# Patient Record
Sex: FEMALE | Race: BLACK OR AFRICAN AMERICAN | Employment: OTHER | URBAN - METROPOLITAN AREA
[De-identification: names, ages, dates, MRNs, and addresses within clinical notes are randomized per-mention and may not be internally consistent; named-entity substitution may affect disease eponyms.]

---

## 2017-03-31 ENCOUNTER — HOSPITAL ENCOUNTER (OUTPATIENT)
Age: 82
Setting detail: SPECIMEN
Discharge: HOME | End: 2017-03-31
Payer: MEDICARE

## 2017-03-31 DIAGNOSIS — I10 ESSENTIAL HYPERTENSION: ICD-10-CM

## 2017-03-31 DIAGNOSIS — E78.00 HIGH CHOLESTEROL: ICD-10-CM

## 2017-03-31 DIAGNOSIS — Z23 NEED FOR INFLUENZA VACCINATION: ICD-10-CM

## 2017-03-31 LAB
ALBUMIN SERPL-MCNC: 4.3 G/DL (ref 3.5–5.2)
ALBUMIN/GLOBULIN RATIO: 1.3 (ref 1–2.5)
ALP BLD-CCNC: 74 U/L (ref 35–104)
ALT SERPL-CCNC: 14 U/L (ref 5–33)
ANION GAP SERPL CALCULATED.3IONS-SCNC: 14 MMOL/L (ref 9–17)
AST SERPL-CCNC: 22 U/L
BILIRUB SERPL-MCNC: 0.38 MG/DL (ref 0.3–1.2)
BUN BLDV-MCNC: 16 MG/DL (ref 8–23)
BUN/CREAT BLD: ABNORMAL (ref 9–20)
CALCIUM SERPL-MCNC: 9.9 MG/DL (ref 8.6–10.4)
CHLORIDE BLD-SCNC: 98 MMOL/L (ref 98–107)
CHOLESTEROL/HDL RATIO: 2.4
CHOLESTEROL: 178 MG/DL
CO2: 27 MMOL/L (ref 20–31)
CREAT SERPL-MCNC: 0.74 MG/DL (ref 0.5–0.9)
GFR AFRICAN AMERICAN: >60 ML/MIN
GFR NON-AFRICAN AMERICAN: >60 ML/MIN
GFR SERPL CREATININE-BSD FRML MDRD: ABNORMAL ML/MIN/{1.73_M2}
GFR SERPL CREATININE-BSD FRML MDRD: ABNORMAL ML/MIN/{1.73_M2}
GLUCOSE BLD-MCNC: 103 MG/DL (ref 70–99)
HDLC SERPL-MCNC: 73 MG/DL
LDL CHOLESTEROL: 87 MG/DL (ref 0–130)
POTASSIUM SERPL-SCNC: 4.3 MMOL/L (ref 3.7–5.3)
SODIUM BLD-SCNC: 139 MMOL/L (ref 135–144)
TOTAL PROTEIN: 7.7 G/DL (ref 6.4–8.3)
TRIGL SERPL-MCNC: 89 MG/DL
VLDLC SERPL CALC-MCNC: NORMAL MG/DL (ref 1–30)

## 2017-04-04 ENCOUNTER — OFFICE VISIT (OUTPATIENT)
Dept: INTERNAL MEDICINE CLINIC | Age: 82
End: 2017-04-04
Payer: MEDICARE

## 2017-04-04 VITALS
DIASTOLIC BLOOD PRESSURE: 60 MMHG | BODY MASS INDEX: 24.07 KG/M2 | RESPIRATION RATE: 16 BRPM | TEMPERATURE: 98.3 F | HEART RATE: 64 BPM | SYSTOLIC BLOOD PRESSURE: 120 MMHG | WEIGHT: 130.8 LBS

## 2017-04-04 DIAGNOSIS — I10 ESSENTIAL HYPERTENSION: Primary | ICD-10-CM

## 2017-04-04 DIAGNOSIS — E78.00 HIGH CHOLESTEROL: ICD-10-CM

## 2017-04-04 PROCEDURE — 90732 PPSV23 VACC 2 YRS+ SUBQ/IM: CPT | Performed by: INTERNAL MEDICINE

## 2017-04-04 PROCEDURE — G0009 ADMIN PNEUMOCOCCAL VACCINE: HCPCS | Performed by: INTERNAL MEDICINE

## 2017-04-04 PROCEDURE — 99213 OFFICE O/P EST LOW 20 MIN: CPT | Performed by: INTERNAL MEDICINE

## 2017-04-04 ASSESSMENT — PATIENT HEALTH QUESTIONNAIRE - PHQ9
SUM OF ALL RESPONSES TO PHQ9 QUESTIONS 1 & 2: 0
1. LITTLE INTEREST OR PLEASURE IN DOING THINGS: 0
2. FEELING DOWN, DEPRESSED OR HOPELESS: 0
SUM OF ALL RESPONSES TO PHQ QUESTIONS 1-9: 0

## 2017-04-18 RX ORDER — METOPROLOL TARTRATE 50 MG/1
TABLET, FILM COATED ORAL
Qty: 60 TABLET | Refills: 5 | Status: SHIPPED | OUTPATIENT
Start: 2017-04-18 | End: 2017-10-09 | Stop reason: SDUPTHER

## 2017-04-18 RX ORDER — NIFEDIPINE 60 MG/1
TABLET, EXTENDED RELEASE ORAL
Qty: 30 TABLET | Refills: 5 | Status: SHIPPED | OUTPATIENT
Start: 2017-04-18 | End: 2017-10-09 | Stop reason: SDUPTHER

## 2017-05-17 RX ORDER — POTASSIUM CHLORIDE 750 MG/1
TABLET, FILM COATED, EXTENDED RELEASE ORAL
Qty: 90 TABLET | Refills: 5 | Status: SHIPPED | OUTPATIENT
Start: 2017-05-17 | End: 2017-11-15 | Stop reason: SDUPTHER

## 2017-05-17 RX ORDER — LISINOPRIL AND HYDROCHLOROTHIAZIDE 20; 12.5 MG/1; MG/1
TABLET ORAL
Qty: 30 TABLET | Refills: 5 | Status: SHIPPED | OUTPATIENT
Start: 2017-05-17 | End: 2017-11-14 | Stop reason: SDUPTHER

## 2017-05-17 RX ORDER — PRAVASTATIN SODIUM 20 MG
TABLET ORAL
Qty: 30 TABLET | Refills: 5 | Status: SHIPPED | OUTPATIENT
Start: 2017-05-17 | End: 2017-11-14 | Stop reason: SDUPTHER

## 2017-05-17 RX ORDER — RANITIDINE 150 MG/1
TABLET ORAL
Qty: 60 TABLET | Refills: 5 | Status: SHIPPED | OUTPATIENT
Start: 2017-05-17 | End: 2017-11-14 | Stop reason: SDUPTHER

## 2017-06-20 RX ORDER — FUROSEMIDE 40 MG/1
TABLET ORAL
Qty: 15 TABLET | Refills: 5 | Status: SHIPPED | OUTPATIENT
Start: 2017-06-20 | End: 2017-12-16 | Stop reason: SDUPTHER

## 2017-06-22 ENCOUNTER — TELEPHONE (OUTPATIENT)
Dept: PHARMACY | Facility: CLINIC | Age: 82
End: 2017-06-22

## 2017-10-05 ENCOUNTER — OFFICE VISIT (OUTPATIENT)
Dept: INTERNAL MEDICINE CLINIC | Age: 82
End: 2017-10-05
Payer: MEDICARE

## 2017-10-05 VITALS
WEIGHT: 131 LBS | SYSTOLIC BLOOD PRESSURE: 116 MMHG | RESPIRATION RATE: 20 BRPM | DIASTOLIC BLOOD PRESSURE: 60 MMHG | HEART RATE: 88 BPM | TEMPERATURE: 98.3 F | HEIGHT: 62 IN | BODY MASS INDEX: 24.11 KG/M2

## 2017-10-05 DIAGNOSIS — I10 ESSENTIAL HYPERTENSION: Primary | ICD-10-CM

## 2017-10-05 DIAGNOSIS — Z23 NEED FOR INFLUENZA VACCINATION: ICD-10-CM

## 2017-10-05 DIAGNOSIS — E78.00 HIGH CHOLESTEROL: ICD-10-CM

## 2017-10-05 PROCEDURE — G0008 ADMIN INFLUENZA VIRUS VAC: HCPCS | Performed by: INTERNAL MEDICINE

## 2017-10-05 PROCEDURE — 99213 OFFICE O/P EST LOW 20 MIN: CPT | Performed by: INTERNAL MEDICINE

## 2017-10-05 PROCEDURE — 90688 IIV4 VACCINE SPLT 0.5 ML IM: CPT | Performed by: INTERNAL MEDICINE

## 2017-10-05 NOTE — MR AVS SNAPSHOT
After Visit Summary             Dalia Pruitt   10/5/2017 9:30 AM   Office Visit    Description:  Female : 3/4/1932   Provider:  Venancio Fields MD   Department:  [facility]              Your Follow-Up and Future Appointments         Below is a list of your follow-up and future appointments. This may not be a complete list as you may have made appointments directly with providers that we are not aware of or your providers may have made some for you. Please call your providers to confirm appointments. It is important to keep your appointments. Please bring your current insurance card, photo ID, co-pay, and all medication bottles to your appointment. If self-pay, payment is expected at the time of service. Your To-Do List     Future Appointments Provider Department Dept Phone    2018 9:30 AM Venancio Fields MD [facility] [phone]    Please arrive 15 minutes prior to appointment, bring photo ID and insurance card. Future Orders Complete By Expires    CBC [QLF780 Custom]  10/5/2017 10/5/2018    Comprehensive Metabolic Panel [WKH32 Custom]  10/5/2017 10/5/2018    Lipid Panel [LAB18 Custom]  10/16/2017 10/5/2018    Follow-Up    Return in about 6 months (around 2018).          Information from Your Visit        Department     Name Address Phone Fax    [facility] [address] [place] [address] [facility] [phone]      You Were Seen for:         Comments    Essential hypertension   [202363]         Vital Signs     Blood Pressure Pulse Temperature Respirations Height Weight    116/60 (Site: Left Arm, Position: Sitting, Cuff Size: Medium Adult) 88 98.3 °F (36.8 °C) (Oral) 20 5' 1.81\" (1.57 m) 131 lb (59.4 kg)    Body Mass Index Smoking Status                24.11 kg/m2 Never Smoker             Medications and Orders      Your Current Medications Are Photoways username and password. If you don't have a Photoways username and password but a parent or guardian has access to your record, the parent or guardian should login with their own Photoways username and password and access your record to view the After Visit Summary. Additional Information  If you have questions, please contact the physician practice where you receive care. Remember, Photoways is NOT to be used for urgent needs. For medical emergencies, dial 911. For questions regarding your Photoways account call [phone]. If you have a clinical question, please call your doctor's office.

## 2017-10-05 NOTE — PROGRESS NOTES
Chronic Disease Visit Information    BP Readings from Last 3 Encounters:   04/04/17 120/60   10/04/16 130/72   04/01/16 126/80          LDL Cholesterol (mg/dL)   Date Value   03/31/2017 87     HDL (mg/dL)   Date Value   03/31/2017 73     BUN (mg/dL)   Date Value   03/31/2017 16     CREATININE (mg/dL)   Date Value   03/31/2017 0.74     Glucose (mg/dL)   Date Value   03/31/2017 103 (H)   05/25/2012 102            Have you changed or started any medications since your last visit including any over-the-counter medicines, vitamins, or herbal medicines? no   Are you having any side effects from any of your medications? -  no  Have you stopped taking any of your medications? Is so, why? -  no    Have you seen any other physician or provider since your last visit? No  Have you had any other diagnostic tests since your last visit? No  Have you been seen in the emergency room and/or had an admission to a hospital since we last saw you? No  Have you had your annual diabetic retinal (eye) exam? No  Have you had your routine dental cleaning in the past 6 months? yes - 9/26/17    Have you activated your Keepsafe account? If not, what are your barriers?  Yes     Patient Care Team:  All Schuler MD as PCP - General (Internal Medicine)  All Schuler MD as PCP - [facility] Attributed Provider         Medical History Review  Past Medical, Family, and Social History reviewed and does contribute to the patient presenting condition    Health Maintenance   Topic Date Due    Flu vaccine (1) 09/01/2017    DTaP/Tdap/Td vaccine (1 - Tdap) 10/10/2018 (Originally 3/4/1951)    Zostavax vaccine  Addressed    DEXA (modify frequency per FRAX score)  Completed    Pneumococcal low/med risk  Completed

## 2017-10-05 NOTE — PROGRESS NOTES
normal reflexes bilaterally  Skin: warm and dry  Psychiatric:  normal mood and effect; behavior normal.    Labs:   No results found for: LABA1C  Lab Results   Component Value Date    CHOL 178 03/31/2017     Lab Results   Component Value Date    HDL 73 03/31/2017     No results found for: [address]  Lab Results   Component Value Date    TRIG 89 03/31/2017     No components found for: CHOLHDL  No results found for: WBC, HGB, HCT, MCV, PLT  No results found for: INR, PROTIME  Lab Results   Component Value Date    GLUCOSE 103 (H) 03/31/2017    CREATININE 0.74 03/31/2017    BUN 16 03/31/2017     03/31/2017    K 4.3 03/31/2017    CL 98 03/31/2017    CO2 27 03/31/2017     Lab Results   Component Value Date    ALT 14 03/31/2017    AST 22 03/31/2017    ALKPHOS 74 03/31/2017    BILITOT 0.38 03/31/2017     Lab Results   Component Value Date    LABPROT 7.2 02/13/2013    LABALBU 4.3 03/31/2017     No results found for: TSH, CBC  Assessment:  1. Essential hypertension    2. High cholesterol        Plan:  Patient's blood pressure stable on current medications  Last cholesterol profile was within normal limits and will repeat at next visit  Flu vaccine given  Review in 6 months           1. Dalia received counseling on the following healthy behaviors: exercise    2. Prior labs and health maintenance reviewed. 3.  Discussed use, benefit, and side effects of prescribed medications. Barriers to medication compliance addressed. All her questions were answered. Pt voiced understanding. Dalia will continue current medications, diet and exercise. No orders of the defined types were placed in this encounter. Completed Refills               Requested Prescriptions      No prescriptions requested or ordered in this encounter     4. Patient given educational materials - see patient instructions    5. Was a self-tracking handout given in paper form or via [employer]?   NO    Orders Placed This Encounter Procedures    Comprehensive Metabolic Panel     Standing Status:   Future     Standing Expiration Date:   10/5/2018    Lipid Panel     Standing Status:   Future     Standing Expiration Date:   10/5/2018     Order Specific Question:   Is Patient Fasting?/# of Hours     Answer:   12    CBC     Standing Status:   Future     Standing Expiration Date:   10/5/2018     Return in about 6 months (around 4/5/2018). Patient voiced understanding and agreed to treatment plan. Electronically signed by Jaspreet Helton MD on 10/5/2017 at 10:03 AM    This note is created with a voice recognition program and while intend to generate a document that accurately reflects the content of the visit, no guarantee can be provided that every mistake has been identified and corrected by editing.

## 2017-10-09 RX ORDER — METOPROLOL TARTRATE 50 MG/1
TABLET, FILM COATED ORAL
Qty: 60 TABLET | Refills: 5 | Status: SHIPPED | OUTPATIENT
Start: 2017-10-09 | End: 2018-04-19 | Stop reason: SDUPTHER

## 2017-10-09 RX ORDER — NIFEDIPINE 60 MG/1
TABLET, EXTENDED RELEASE ORAL
Qty: 30 TABLET | Refills: 5 | Status: SHIPPED | OUTPATIENT
Start: 2017-10-09 | End: 2018-08-20 | Stop reason: CLARIF

## 2017-11-14 RX ORDER — RANITIDINE 150 MG/1
TABLET ORAL
Qty: 60 TABLET | Refills: 5 | Status: SHIPPED | OUTPATIENT
Start: 2017-11-14 | End: 2018-05-21 | Stop reason: SDUPTHER

## 2017-11-14 RX ORDER — PRAVASTATIN SODIUM 20 MG
TABLET ORAL
Qty: 30 TABLET | Refills: 5 | Status: SHIPPED | OUTPATIENT
Start: 2017-11-14 | End: 2018-05-21 | Stop reason: SDUPTHER

## 2017-11-14 RX ORDER — LISINOPRIL AND HYDROCHLOROTHIAZIDE 20; 12.5 MG/1; MG/1
TABLET ORAL
Qty: 30 TABLET | Refills: 5 | Status: SHIPPED | OUTPATIENT
Start: 2017-11-14 | End: 2018-05-21 | Stop reason: SDUPTHER

## 2017-11-15 ENCOUNTER — TELEPHONE (OUTPATIENT)
Dept: INTERNAL MEDICINE CLINIC | Age: 82
End: 2017-11-15

## 2017-11-15 RX ORDER — POTASSIUM CHLORIDE 750 MG/1
TABLET, FILM COATED, EXTENDED RELEASE ORAL
Qty: 90 TABLET | Refills: 5 | Status: SHIPPED | OUTPATIENT
Start: 2017-11-15 | End: 2018-08-20 | Stop reason: CLARIF

## 2017-12-18 RX ORDER — FUROSEMIDE 40 MG/1
TABLET ORAL
Qty: 15 TABLET | Refills: 4 | Status: SHIPPED | OUTPATIENT
Start: 2017-12-18 | End: 2018-05-21 | Stop reason: SDUPTHER

## 2018-01-25 ENCOUNTER — TELEPHONE (OUTPATIENT)
Dept: INTERNAL MEDICINE CLINIC | Age: 83
End: 2018-01-25

## 2018-01-25 NOTE — TELEPHONE ENCOUNTER
Nifedipine is too $$, that is correct med she is on  Also reviewed meds    [facility]  Pt does need to be informed

## 2018-01-25 NOTE — TELEPHONE ENCOUNTER
Patient stating that her amlodipine is to expensive. Is asking for something cheaper to be called in to her pharmacy?

## 2018-02-14 ENCOUNTER — ANTI-COAG VISIT (OUTPATIENT)
Dept: INTERNAL MEDICINE CLINIC | Age: 83
End: 2018-02-14

## 2018-02-14 ENCOUNTER — TELEPHONE (OUTPATIENT)
Dept: INTERNAL MEDICINE CLINIC | Age: 83
End: 2018-02-14

## 2018-03-15 ENCOUNTER — HOSPITAL ENCOUNTER (OUTPATIENT)
Age: 83
Setting detail: SPECIMEN
Discharge: HOME | End: 2018-03-15
Payer: MEDICARE

## 2018-03-15 DIAGNOSIS — I10 ESSENTIAL HYPERTENSION: ICD-10-CM

## 2018-03-15 DIAGNOSIS — E78.00 HIGH CHOLESTEROL: ICD-10-CM

## 2018-03-15 LAB
ALBUMIN SERPL-MCNC: 3.9 G/DL (ref 3.5–5.2)
ALBUMIN/GLOBULIN RATIO: 1.2 (ref 1–2.5)
ALP BLD-CCNC: 70 U/L (ref 35–104)
ALT SERPL-CCNC: 14 U/L (ref 5–33)
ANION GAP SERPL CALCULATED.3IONS-SCNC: 13 MMOL/L (ref 9–17)
AST SERPL-CCNC: 24 U/L
BILIRUB SERPL-MCNC: 0.41 MG/DL (ref 0.3–1.2)
BUN BLDV-MCNC: 16 MG/DL (ref 8–23)
BUN/CREAT BLD: NORMAL (ref 9–20)
CALCIUM SERPL-MCNC: 9.7 MG/DL (ref 8.6–10.4)
CHLORIDE BLD-SCNC: 102 MMOL/L (ref 98–107)
CHOLESTEROL/HDL RATIO: 2.7
CHOLESTEROL: 152 MG/DL
CO2: 29 MMOL/L (ref 20–31)
CREAT SERPL-MCNC: 0.73 MG/DL (ref 0.5–0.9)
GFR AFRICAN AMERICAN: >60 ML/MIN
GFR NON-AFRICAN AMERICAN: >60 ML/MIN
GFR SERPL CREATININE-BSD FRML MDRD: NORMAL ML/MIN/{1.73_M2}
GFR SERPL CREATININE-BSD FRML MDRD: NORMAL ML/MIN/{1.73_M2}
GLUCOSE BLD-MCNC: 92 MG/DL (ref 70–99)
HCT VFR BLD CALC: 45.2 % (ref 36.3–47.1)
HDLC SERPL-MCNC: 57 MG/DL
HEMOGLOBIN: 13.9 G/DL (ref 11.9–15.1)
LDL CHOLESTEROL: 71 MG/DL (ref 0–130)
MCH RBC QN AUTO: 29.1 PG (ref 25.2–33.5)
MCHC RBC AUTO-ENTMCNC: 30.8 G/DL (ref 28.4–34.8)
MCV RBC AUTO: 94.6 FL (ref 82.6–102.9)
NRBC AUTOMATED: 0 PER 100 WBC
PDW BLD-RTO: 13.3 % (ref 11.8–14.4)
PLATELET # BLD: 409 K/UL (ref 138–453)
PMV BLD AUTO: 9.9 FL (ref 8.1–13.5)
POTASSIUM SERPL-SCNC: 4.4 MMOL/L (ref 3.7–5.3)
RBC # BLD: 4.78 M/UL (ref 3.95–5.11)
SODIUM BLD-SCNC: 144 MMOL/L (ref 135–144)
TOTAL PROTEIN: 7.1 G/DL (ref 6.4–8.3)
TRIGL SERPL-MCNC: 122 MG/DL
VLDLC SERPL CALC-MCNC: NORMAL MG/DL (ref 1–30)
WBC # BLD: 7.9 K/UL (ref 3.5–11.3)

## 2018-04-05 ENCOUNTER — HOSPITAL ENCOUNTER (OUTPATIENT)
Age: 83
Discharge: HOME | End: 2018-04-07
Payer: MEDICARE

## 2018-04-05 ENCOUNTER — HOSPITAL ENCOUNTER (OUTPATIENT)
Dept: GENERAL RADIOLOGY | Age: 83
Discharge: HOME | End: 2018-04-07
Payer: MEDICARE

## 2018-04-05 ENCOUNTER — OFFICE VISIT (OUTPATIENT)
Dept: INTERNAL MEDICINE CLINIC | Age: 83
End: 2018-04-05
Payer: MEDICARE

## 2018-04-05 VITALS
BODY MASS INDEX: 23.17 KG/M2 | OXYGEN SATURATION: 97 % | HEIGHT: 62 IN | HEART RATE: 78 BPM | DIASTOLIC BLOOD PRESSURE: 72 MMHG | TEMPERATURE: 98.4 F | RESPIRATION RATE: 16 BRPM | WEIGHT: 125.9 LBS | SYSTOLIC BLOOD PRESSURE: 136 MMHG

## 2018-04-05 DIAGNOSIS — R63.0 LOSS OF APPETITE: ICD-10-CM

## 2018-04-05 DIAGNOSIS — E78.00 HIGH CHOLESTEROL: ICD-10-CM

## 2018-04-05 DIAGNOSIS — I34.0 MITRAL VALVE INSUFFICIENCY, UNSPECIFIED ETIOLOGY: ICD-10-CM

## 2018-04-05 DIAGNOSIS — R05.9 COUGH: ICD-10-CM

## 2018-04-05 DIAGNOSIS — I10 ESSENTIAL HYPERTENSION: ICD-10-CM

## 2018-04-05 DIAGNOSIS — I10 ESSENTIAL HYPERTENSION: Primary | ICD-10-CM

## 2018-04-05 PROCEDURE — 71046 X-RAY EXAM CHEST 2 VIEWS: CPT

## 2018-04-05 PROCEDURE — 99214 OFFICE O/P EST MOD 30 MIN: CPT | Performed by: INTERNAL MEDICINE

## 2018-04-05 RX ORDER — LORATADINE 10 MG/1
10 TABLET ORAL DAILY
Qty: 30 TABLET | Refills: 0 | Status: SHIPPED | OUTPATIENT
Start: 2018-04-05 | End: 2018-05-02 | Stop reason: SDUPTHER

## 2018-04-05 RX ORDER — FLUTICASONE PROPIONATE 50 MCG
2 SPRAY, SUSPENSION (ML) NASAL DAILY
Qty: 1 BOTTLE | Refills: 0 | Status: SHIPPED | OUTPATIENT
Start: 2018-04-05

## 2018-04-05 ASSESSMENT — PATIENT HEALTH QUESTIONNAIRE - PHQ9
2. FEELING DOWN, DEPRESSED OR HOPELESS: 0
SUM OF ALL RESPONSES TO PHQ9 QUESTIONS 1 & 2: 0
1. LITTLE INTEREST OR PLEASURE IN DOING THINGS: 0
SUM OF ALL RESPONSES TO PHQ QUESTIONS 1-9: 0

## 2018-04-19 RX ORDER — METOPROLOL TARTRATE 50 MG/1
TABLET, FILM COATED ORAL
Qty: 60 TABLET | Refills: 5 | Status: SHIPPED | OUTPATIENT
Start: 2018-04-19 | End: 2018-08-20 | Stop reason: CLARIF

## 2018-04-20 ENCOUNTER — HOSPITAL ENCOUNTER (OUTPATIENT)
Dept: NON INVASIVE DIAGNOSTICS | Age: 83
Discharge: HOME | End: 2018-04-20
Payer: MEDICARE

## 2018-04-20 ENCOUNTER — TELEPHONE (OUTPATIENT)
Dept: INTERNAL MEDICINE CLINIC | Age: 83
End: 2018-04-20

## 2018-04-20 DIAGNOSIS — E78.00 HIGH CHOLESTEROL: ICD-10-CM

## 2018-04-20 DIAGNOSIS — I10 ESSENTIAL HYPERTENSION: ICD-10-CM

## 2018-04-20 DIAGNOSIS — I34.0 MITRAL VALVE INSUFFICIENCY, UNSPECIFIED ETIOLOGY: ICD-10-CM

## 2018-04-20 DIAGNOSIS — I35.0 AORTIC VALVE STENOSIS, ETIOLOGY OF CARDIAC VALVE DISEASE UNSPECIFIED: Primary | ICD-10-CM

## 2018-04-20 DIAGNOSIS — R63.0 LOSS OF APPETITE: ICD-10-CM

## 2018-04-20 DIAGNOSIS — R05.9 COUGH: ICD-10-CM

## 2018-04-20 LAB
LV EF: 50 %
LVEF MODALITY: NORMAL

## 2018-04-20 PROCEDURE — 93306 TTE W/DOPPLER COMPLETE: CPT

## 2018-05-02 RX ORDER — LORATADINE 10 MG/1
10 TABLET ORAL DAILY
Qty: 30 TABLET | Refills: 5 | Status: SHIPPED | OUTPATIENT
Start: 2018-05-02 | End: 2019-01-18 | Stop reason: SDUPTHER

## 2018-05-21 ENCOUNTER — TELEPHONE (OUTPATIENT)
Dept: INTERNAL MEDICINE CLINIC | Age: 83
End: 2018-05-21

## 2018-05-21 RX ORDER — POTASSIUM CHLORIDE 750 MG/1
TABLET, EXTENDED RELEASE ORAL
Qty: 90 TABLET | Refills: 3 | Status: SHIPPED | OUTPATIENT
Start: 2018-05-21 | End: 2018-08-20 | Stop reason: CLARIF

## 2018-05-21 RX ORDER — RANITIDINE 150 MG/1
TABLET ORAL
Qty: 60 TABLET | Refills: 3 | Status: SHIPPED | OUTPATIENT
Start: 2018-05-21 | End: 2018-08-31 | Stop reason: SDUPTHER

## 2018-05-21 RX ORDER — PRAVASTATIN SODIUM 20 MG
TABLET ORAL
Qty: 30 TABLET | Refills: 3 | Status: SHIPPED | OUTPATIENT
Start: 2018-05-21 | End: 2018-08-20 | Stop reason: CLARIF

## 2018-05-21 RX ORDER — FUROSEMIDE 40 MG/1
TABLET ORAL
Qty: 15 TABLET | Refills: 3 | Status: SHIPPED | OUTPATIENT
Start: 2018-05-21 | End: 2018-08-20 | Stop reason: CLARIF

## 2018-05-21 RX ORDER — LISINOPRIL AND HYDROCHLOROTHIAZIDE 20; 12.5 MG/1; MG/1
TABLET ORAL
Qty: 30 TABLET | Refills: 3 | Status: SHIPPED | OUTPATIENT
Start: 2018-05-21 | End: 2018-08-20

## 2018-08-13 ENCOUNTER — TELEPHONE (OUTPATIENT)
Dept: INTERNAL MEDICINE CLINIC | Age: 83
End: 2018-08-13

## 2018-08-20 ENCOUNTER — OFFICE VISIT (OUTPATIENT)
Dept: INTERNAL MEDICINE CLINIC | Age: 83
End: 2018-08-20
Payer: MEDICARE

## 2018-08-20 VITALS
BODY MASS INDEX: 24.11 KG/M2 | HEIGHT: 62 IN | HEART RATE: 80 BPM | RESPIRATION RATE: 16 BRPM | SYSTOLIC BLOOD PRESSURE: 126 MMHG | DIASTOLIC BLOOD PRESSURE: 68 MMHG | WEIGHT: 131 LBS | TEMPERATURE: 98.3 F

## 2018-08-20 DIAGNOSIS — Z95.2 S/P AVR (AORTIC VALVE REPLACEMENT): ICD-10-CM

## 2018-08-20 DIAGNOSIS — I48.0 PAROXYSMAL ATRIAL FIBRILLATION (HCC): ICD-10-CM

## 2018-08-20 DIAGNOSIS — I35.0 AORTIC VALVE STENOSIS, ETIOLOGY OF CARDIAC VALVE DISEASE UNSPECIFIED: Primary | ICD-10-CM

## 2018-08-20 DIAGNOSIS — I10 ESSENTIAL HYPERTENSION: ICD-10-CM

## 2018-08-20 PROCEDURE — 99214 OFFICE O/P EST MOD 30 MIN: CPT | Performed by: INTERNAL MEDICINE

## 2018-08-20 RX ORDER — DILTIAZEM HYDROCHLORIDE 180 MG/1
180 CAPSULE, COATED, EXTENDED RELEASE ORAL DAILY
COMMUNITY
Start: 2018-08-01 | End: 2018-12-29 | Stop reason: ALTCHOICE

## 2018-08-20 RX ORDER — AMIODARONE HYDROCHLORIDE 200 MG/1
100 TABLET ORAL DAILY
COMMUNITY
End: 2019-10-10 | Stop reason: ALTCHOICE

## 2018-08-20 RX ORDER — CARVEDILOL 25 MG/1
25 TABLET ORAL 2 TIMES DAILY
COMMUNITY

## 2018-08-20 RX ORDER — ATORVASTATIN CALCIUM 20 MG/1
20 TABLET, FILM COATED ORAL DAILY
COMMUNITY
Start: 2018-07-31 | End: 2022-09-27

## 2018-08-20 RX ORDER — SPIRONOLACTONE 25 MG/1
25 TABLET ORAL DAILY
COMMUNITY
Start: 2018-08-01 | End: 2019-04-16

## 2018-08-20 RX ORDER — WARFARIN SODIUM 5 MG/1
TABLET ORAL
COMMUNITY
Start: 2018-07-31 | End: 2018-12-29 | Stop reason: ALTCHOICE

## 2018-08-20 RX ORDER — LOSARTAN POTASSIUM 100 MG/1
100 TABLET ORAL DAILY
COMMUNITY
End: 2019-10-10 | Stop reason: DRUGHIGH

## 2018-08-20 ASSESSMENT — PATIENT HEALTH QUESTIONNAIRE - PHQ9
1. LITTLE INTEREST OR PLEASURE IN DOING THINGS: 0
SUM OF ALL RESPONSES TO PHQ QUESTIONS 1-9: 0
SUM OF ALL RESPONSES TO PHQ QUESTIONS 1-9: 0
2. FEELING DOWN, DEPRESSED OR HOPELESS: 0
SUM OF ALL RESPONSES TO PHQ9 QUESTIONS 1 & 2: 0

## 2018-08-20 NOTE — PROGRESS NOTES
Visit Information    Have you changed or started any medications since your last visit including any over-the-counter medicines, vitamins, or herbal medicines? yes - see list   Are you having any side effects from any of your medications? -  no  Have you stopped taking any of your medications? Is so, why? -  yes - see list    Have you seen any other physician or provider since your last visit? Yes - Records Obtained  Have you had any other diagnostic tests since your last visit? Yes - Records Obtained  Have you been seen in the emergency room and/or had an admission to a hospital since we last saw you? Yes - Records Obtained  Have you had your routine dental cleaning in the past 6 months? yes -     Have you activated your Ampulse account? If not, what are your barriers?  Yes     Patient Care Team:  Faustina Gonzalez MD as PCP - General (Internal Medicine)  Faustina Gonzalez MD as PCP - [facility] Attributed Provider    Medical History Review  Past Medical, Family, and Social History reviewed and does contribute to the patient presenting condition    Health Maintenance   Topic Date Due    TSH testing  03/04/1932    DTaP/Tdap/Td vaccine (1 - Tdap) 10/10/2018 (Originally 3/4/1951)    Shingles Vaccine (1 of 2 - 2 Dose Series) 04/26/2019 (Originally 3/4/1982)    Flu vaccine (1) 09/01/2018    Potassium monitoring  03/15/2019    Creatinine monitoring  03/15/2019    Pneumococcal low/med risk  Completed
and warfarin and patient will be following with cardiology at the end of this month  Patient's right-sided chest tube wound was slightly draining and patient is getting dressed and following with the cardiothoracic surgery on a regular basis  Review in 6 months           1. Cleatricerica received counseling on the following healthy behaviors: nutrition and exercise    2. Prior labs and health maintenance reviewed. 3.  Discussed use, benefit, and side effects of prescribed medications. Barriers to medication compliance addressed. All her questions were answered. Pt voiced understanding. Cleatrice will continue current medications, diet and exercise. No orders of the defined types were placed in this encounter. Completed Refills               Requested Prescriptions      No prescriptions requested or ordered in this encounter     4. Patient given educational materials - see patient instructions    5. Was a self-tracking handout given in paper form or via [employer]? NO    No orders of the defined types were placed in this encounter. No Follow-up on file. Patient voiced understanding and agreed to treatment plan. Electronically signed by Martínez Garcias MD on 8/20/2018 at 1:11 PM    This note is created with a voice recognition program and while intend to generate a document that accurately reflects the content of the visit, no guarantee can be provided that every mistake has been identified and corrected by editing.

## 2018-08-21 ENCOUNTER — TELEPHONE (OUTPATIENT)
Dept: PHARMACY | Facility: CLINIC | Age: 83
End: 2018-08-21

## 2018-08-21 DIAGNOSIS — Z79.899 ENCOUNTER FOR MEDICATION REVIEW: Primary | ICD-10-CM

## 2018-08-21 PROCEDURE — 1111F DSCHRG MED/CURRENT MED MERGE: CPT

## 2018-08-21 RX ORDER — VITAMIN E 268 MG
400 CAPSULE ORAL DAILY
COMMUNITY

## 2018-08-21 NOTE — TELEPHONE ENCOUNTER
CLINICAL PHARMACY NOTE  Post-Discharge Transitions of Care (MAG)    Non-face-to-face services provided:  Assessment and support for treatment adherence and medication management-- med rec    Subjective/Objective:  Caroline Schofield is a 80 y.o. female. Patient was discharged from [facility] on 7/31/18. Patient outreach to review discharge medications and provide medication review and management. Spoke with patient. Patient states she is feeling pretty good. States she has cardiologist appt next week and cardiologist states he may take her off some of the blood pressure medications. New start on warfarin. INR check today, nurse coming to patient's house. No Known Allergies    Discharge Medications (as per discharging medication list per patient):  - see updated med list    Additional Medications:  Vit E    CrCl cannot be calculated (Patient's most recent lab result is older than the maximum 120 days allowed. ). Assessment/Plan:    - Pt is taking medications as directed by discharging physician. Number of discrepancies: 1. Instructions per discharge list provided except per below documentation. Identified medication discrepancies/issues:     · Category 4 (1):  1. Updated med list- vit E, warfarin dose     - Identified Potential Medication Interactions: The following clinically significant interactions were identified via Add2paper Interaction Analysis as category D or higher: amiodarone and warfarin- amiodarone significantly increases INR. diltiazem and atorvastatin- diltiazem can increase atorvastatin concentration. warfarin and aspirin- increae risk of bleeeding. .    - amiodarone and warfarin interaction Nurse was there at the house when I called patient to do the INR. Patient also has cardiologist appt next week. Cont to monitor INR.     -- diltiazem and atorvastatin- keep atorvastatin at 20 mg HS, monitor for side effects of statin    - warfarin and aspirin- monitor for S/S of bleeding.  Cont PPI    - I educated on warfarin. Discussed food interaction and patient stated she was not aware of food interaction     - Renal Dosing: No renal adjustments necessary.     · Patient saw PCP 8/20/18    Thank you,    Yvan Wooten, PharmD, [facility] Pharmacist  Direct: [phone]  Toll Free: [phone], Option 7    ============================================  CLINICAL PHARMACY NOTE   POST-DISCHARGE TELEPHONE FOLLOW-UP ADDENDUM    For Pharmacy Admin Tracking Only    TCM Call Made?: No  [facility] Select Patient?: Yes  Total # of Interventions Recommended: 1 - Updated Order #: 1  Total # Interventions Accepted: 1  Intervention Severity:   - Level 1 Intervention Present?: No   - Level 2 #: 0   - Level 3 #: 0  Outreach Status: Review Complete  Care Coordinator Outreach to Patient?: No  Provider Contacted?: No  Time Spent (min): 30

## 2018-09-01 RX ORDER — RANITIDINE 150 MG/1
TABLET ORAL
Qty: 60 TABLET | Refills: 5 | Status: SHIPPED | OUTPATIENT
Start: 2018-09-01 | End: 2018-12-29 | Stop reason: DRUGHIGH

## 2018-09-11 ENCOUNTER — HOSPITAL ENCOUNTER (OUTPATIENT)
Age: 83
Setting detail: SPECIMEN
Discharge: HOME | End: 2018-09-11
Payer: MEDICARE

## 2018-09-11 LAB
INR BLD: 2.4
PROTHROMBIN TIME: 24.1 SEC (ref 9–12)

## 2018-09-17 RX ORDER — POTASSIUM CHLORIDE 750 MG/1
TABLET, EXTENDED RELEASE ORAL
Qty: 90 TABLET | Refills: 5 | Status: SHIPPED | OUTPATIENT
Start: 2018-09-17 | End: 2018-12-17 | Stop reason: CLARIF

## 2018-09-17 RX ORDER — FUROSEMIDE 40 MG/1
TABLET ORAL
Qty: 15 TABLET | Refills: 5 | Status: SHIPPED | OUTPATIENT
Start: 2018-09-17 | End: 2018-12-17 | Stop reason: CLARIF

## 2018-09-17 RX ORDER — PRAVASTATIN SODIUM 20 MG
TABLET ORAL
Qty: 30 TABLET | Refills: 5 | Status: SHIPPED | OUTPATIENT
Start: 2018-09-17 | End: 2018-12-17 | Stop reason: CLARIF

## 2018-09-17 RX ORDER — LISINOPRIL AND HYDROCHLOROTHIAZIDE 20; 12.5 MG/1; MG/1
TABLET ORAL
Qty: 30 TABLET | Refills: 5 | Status: SHIPPED | OUTPATIENT
Start: 2018-09-17 | End: 2018-12-17 | Stop reason: CLARIF

## 2018-09-25 ENCOUNTER — HOSPITAL ENCOUNTER (OUTPATIENT)
Age: 83
Setting detail: SPECIMEN
Discharge: HOME | End: 2018-09-25
Payer: MEDICARE

## 2018-09-25 LAB
INR BLD: 2.6
PROTHROMBIN TIME: 26.3 SEC (ref 9–12)

## 2018-10-09 ENCOUNTER — HOSPITAL ENCOUNTER (OUTPATIENT)
Age: 83
Setting detail: SPECIMEN
Discharge: HOME | End: 2018-10-09
Payer: MEDICARE

## 2018-10-09 LAB
POC INR: 2.8
PROTHROMBIN TIME, POC: 33.6 SEC (ref 9.6–14.4)

## 2018-11-13 ENCOUNTER — HOSPITAL ENCOUNTER (OUTPATIENT)
Age: 83
Setting detail: SPECIMEN
Discharge: HOME | End: 2018-11-13
Payer: MEDICARE

## 2018-11-13 LAB
POC INR: 2.7
PROTHROMBIN TIME, POC: 32.6 SEC (ref 9.6–14.4)

## 2018-12-11 ENCOUNTER — HOSPITAL ENCOUNTER (OUTPATIENT)
Age: 83
Setting detail: SPECIMEN
Discharge: HOME | End: 2018-12-11
Payer: MEDICARE

## 2018-12-11 LAB
POC INR: 2.6
PROTHROMBIN TIME, POC: 30.2 SEC (ref 9.6–14.4)

## 2018-12-17 ENCOUNTER — OFFICE VISIT (OUTPATIENT)
Dept: INTERNAL MEDICINE CLINIC | Age: 83
End: 2018-12-17
Payer: MEDICARE

## 2018-12-17 VITALS
BODY MASS INDEX: 24.29 KG/M2 | HEIGHT: 62 IN | DIASTOLIC BLOOD PRESSURE: 66 MMHG | SYSTOLIC BLOOD PRESSURE: 122 MMHG | RESPIRATION RATE: 16 BRPM | TEMPERATURE: 98.2 F | HEART RATE: 80 BPM | WEIGHT: 132 LBS

## 2018-12-17 DIAGNOSIS — L02.01 CUTANEOUS ABSCESS OF FACE: Primary | ICD-10-CM

## 2018-12-17 PROCEDURE — 99213 OFFICE O/P EST LOW 20 MIN: CPT | Performed by: INTERNAL MEDICINE

## 2018-12-17 RX ORDER — CEPHALEXIN 500 MG/1
500 CAPSULE ORAL 3 TIMES DAILY
Qty: 21 CAPSULE | Refills: 0 | Status: SHIPPED | OUTPATIENT
Start: 2018-12-17 | End: 2018-12-24

## 2018-12-17 ASSESSMENT — PATIENT HEALTH QUESTIONNAIRE - PHQ9
SUM OF ALL RESPONSES TO PHQ QUESTIONS 1-9: 0
2. FEELING DOWN, DEPRESSED OR HOPELESS: 0
SUM OF ALL RESPONSES TO PHQ QUESTIONS 1-9: 0
1. LITTLE INTEREST OR PLEASURE IN DOING THINGS: 0
SUM OF ALL RESPONSES TO PHQ9 QUESTIONS 1 & 2: 0

## 2018-12-17 NOTE — PROGRESS NOTES
Leann Vincent is a 80 y.o. female who presents for   Chief Complaint   Patient presents with    Lesion(s)     has a swollen tender area on face- she did have a blackhead which she picked, has been using warm compresses and alcohol and bactroban.  Other     going to cardiac rehab 3 times per week. seeing Dr Drew Russo 12/28/18    Hypertension     4 mo check up    Immunizations     had flu and pneumo    and follow up of chronic medical problems. Patient Active Problem List   Diagnosis    Hypertension    Hyperlipidemia     HPI  Here for evaluation of lump on the left side of the face started after picking on a boil    Current Outpatient Prescriptions   Medication Sig Dispense Refill    cephALEXin (KEFLEX) 500 MG capsule Take 1 capsule by mouth 3 times daily for 7 days 21 capsule 0    ranitidine (ZANTAC) 150 MG tablet TAKE 2 TABLETS BY MOUTH EVERY DAY 60 tablet 5    vitamin E 400 UNIT capsule Take 400 Units by mouth daily      losartan (COZAAR) 100 MG tablet Take 100 mg by mouth daily      warfarin (COUMADIN) 5 MG tablet The details of the medication are not available because there are pending changes by a home health clinician. currenlty 2.5 mg daily      amiodarone (CORDARONE) 200 MG tablet Take 200 mg by mouth daily      atorvastatin (LIPITOR) 20 MG tablet Take 20 mg by mouth daily      spironolactone (ALDACTONE) 25 MG tablet Take 25 mg by mouth daily      carvedilol (COREG) 25 MG tablet Take 25 mg by mouth 2 times daily       diltiazem (CARDIZEM CD) 180 MG extended release capsule Take 180 mg by mouth daily      loratadine (CLARITIN) 10 MG tablet TAKE 1 TABLET BY MOUTH DAILY (Patient taking differently: Take 10 mg by mouth daily as needed ) 30 tablet 5    fluticasone (FLONASE) 50 MCG/ACT nasal spray 2 sprays by Nasal route daily (Patient taking differently: 2 sprays by Nasal route daily as needed ) 1 Bottle 0    aspirin 81 MG tablet Take 81 mg by mouth daily.       Multiple Vitamin

## 2018-12-26 ENCOUNTER — TELEPHONE (OUTPATIENT)
Dept: INTERNAL MEDICINE CLINIC | Age: 83
End: 2018-12-26

## 2018-12-26 NOTE — TELEPHONE ENCOUNTER
Pt has completed ATBX, has been using warm compresses and antibiotic ointment to her face and still has abscess- she thinks she needs more ATBX or lanced    Please advise

## 2018-12-29 ENCOUNTER — HOSPITAL ENCOUNTER (EMERGENCY)
Age: 83
Discharge: HOME | End: 2018-12-29
Attending: EMERGENCY MEDICINE
Payer: MEDICARE

## 2018-12-29 VITALS
HEIGHT: 62 IN | TEMPERATURE: 98 F | OXYGEN SATURATION: 96 % | WEIGHT: 132 LBS | RESPIRATION RATE: 18 BRPM | BODY MASS INDEX: 24.29 KG/M2 | HEART RATE: 82 BPM | SYSTOLIC BLOOD PRESSURE: 158 MMHG | DIASTOLIC BLOOD PRESSURE: 67 MMHG

## 2018-12-29 DIAGNOSIS — S00.83XA FACIAL HEMATOMA, INITIAL ENCOUNTER: Primary | ICD-10-CM

## 2018-12-29 LAB
ABSOLUTE EOS #: 0.2 K/UL (ref 0–0.4)
ABSOLUTE IMMATURE GRANULOCYTE: ABNORMAL K/UL (ref 0–0.3)
ABSOLUTE LYMPH #: 1.2 K/UL (ref 1–4.8)
ABSOLUTE MONO #: 0.5 K/UL (ref 0.2–0.8)
BASOPHILS # BLD: 0 % (ref 0–2)
BASOPHILS ABSOLUTE: 0 K/UL (ref 0–0.2)
DIFFERENTIAL TYPE: ABNORMAL
EOSINOPHILS RELATIVE PERCENT: 2 % (ref 1–4)
HCT VFR BLD CALC: 42 % (ref 36–46)
HEMOGLOBIN: 13.6 G/DL (ref 12–16)
IMMATURE GRANULOCYTES: ABNORMAL %
INR BLD: 2.5
LYMPHOCYTES # BLD: 14 % (ref 24–44)
MCH RBC QN AUTO: 28.3 PG (ref 26–34)
MCHC RBC AUTO-ENTMCNC: 32.4 G/DL (ref 31–37)
MCV RBC AUTO: 87.4 FL (ref 80–100)
MONOCYTES # BLD: 6 % (ref 1–7)
NRBC AUTOMATED: ABNORMAL PER 100 WBC
PARTIAL THROMBOPLASTIN TIME: 42.3 SEC (ref 23–31)
PDW BLD-RTO: 15 % (ref 11.5–14.5)
PLATELET # BLD: 266 K/UL (ref 130–400)
PLATELET ESTIMATE: ABNORMAL
PMV BLD AUTO: ABNORMAL FL (ref 6–12)
PROTHROMBIN TIME: 24.8 SEC (ref 9.7–11.6)
RBC # BLD: 4.8 M/UL (ref 4–5.2)
RBC # BLD: ABNORMAL 10*6/UL
SEG NEUTROPHILS: 78 % (ref 36–66)
SEGMENTED NEUTROPHILS ABSOLUTE COUNT: 6.7 K/UL (ref 1.8–7.7)
WBC # BLD: 8.6 K/UL (ref 3.5–11)
WBC # BLD: ABNORMAL 10*3/UL

## 2018-12-29 PROCEDURE — 85025 COMPLETE CBC W/AUTO DIFF WBC: CPT

## 2018-12-29 PROCEDURE — 99283 EMERGENCY DEPT VISIT LOW MDM: CPT

## 2018-12-29 PROCEDURE — 2500000003 HC RX 250 WO HCPCS: Performed by: EMERGENCY MEDICINE

## 2018-12-29 PROCEDURE — 10160 PNXR ASPIR ABSC HMTMA BULLA: CPT

## 2018-12-29 PROCEDURE — 85610 PROTHROMBIN TIME: CPT

## 2018-12-29 PROCEDURE — 85730 THROMBOPLASTIN TIME PARTIAL: CPT

## 2018-12-29 RX ORDER — RANITIDINE 150 MG/1
150 TABLET ORAL 2 TIMES DAILY
COMMUNITY
End: 2019-09-14 | Stop reason: SDUPTHER

## 2018-12-29 RX ORDER — LIDOCAINE HYDROCHLORIDE AND EPINEPHRINE 10; 10 MG/ML; UG/ML
5 INJECTION, SOLUTION INFILTRATION; PERINEURAL ONCE
Status: COMPLETED | OUTPATIENT
Start: 2018-12-29 | End: 2018-12-29

## 2018-12-29 RX ORDER — LATANOPROST 50 UG/ML
1 SOLUTION/ DROPS OPHTHALMIC NIGHTLY
COMMUNITY

## 2018-12-29 RX ADMIN — LIDOCAINE HYDROCHLORIDE,EPINEPHRINE BITARTRATE 5 ML: 10; .01 INJECTION, SOLUTION INFILTRATION; PERINEURAL at 14:19

## 2018-12-29 ASSESSMENT — ENCOUNTER SYMPTOMS
VOMITING: 0
COUGH: 0
WHEEZING: 0
RHINORRHEA: 0
ABDOMINAL PAIN: 0
BLOOD IN STOOL: 0
DIARRHEA: 0
BACK PAIN: 0
NAUSEA: 0
CONSTIPATION: 0
SORE THROAT: 0
CHEST TIGHTNESS: 0
EYE PAIN: 0
SHORTNESS OF BREATH: 0
EYE DISCHARGE: 0
COLOR CHANGE: 0

## 2018-12-29 ASSESSMENT — PAIN SCALES - GENERAL
PAINLEVEL_OUTOF10: 10
PAINLEVEL_OUTOF10: 2
PAINLEVEL_OUTOF10: 1

## 2018-12-29 ASSESSMENT — PAIN DESCRIPTION - PAIN TYPE: TYPE: ACUTE PAIN

## 2018-12-29 ASSESSMENT — PAIN DESCRIPTION - DESCRIPTORS: DESCRIPTORS: TENDER

## 2018-12-29 ASSESSMENT — PAIN DESCRIPTION - ORIENTATION: ORIENTATION: LEFT

## 2018-12-29 ASSESSMENT — PAIN DESCRIPTION - LOCATION: LOCATION: FACE

## 2019-01-22 RX ORDER — LORATADINE 10 MG/1
10 TABLET ORAL DAILY
Qty: 90 TABLET | Refills: 0 | Status: SHIPPED | OUTPATIENT
Start: 2019-01-22 | End: 2019-01-25 | Stop reason: SDUPTHER

## 2019-01-25 RX ORDER — LORATADINE 10 MG/1
10 TABLET ORAL DAILY
Qty: 90 TABLET | Refills: 1 | Status: SHIPPED | OUTPATIENT
Start: 2019-01-25

## 2019-03-20 RX ORDER — RANITIDINE 150 MG/1
TABLET ORAL
Qty: 60 TABLET | Refills: 5 | Status: SHIPPED | OUTPATIENT
Start: 2019-03-20 | End: 2019-04-16 | Stop reason: CLARIF

## 2019-03-27 ENCOUNTER — HOSPITAL ENCOUNTER (OUTPATIENT)
Age: 84
Setting detail: SPECIMEN
Discharge: HOME | End: 2019-03-27
Payer: MEDICARE

## 2019-03-27 LAB
ABSOLUTE EOS #: 0.11 K/UL (ref 0–0.44)
ABSOLUTE IMMATURE GRANULOCYTE: 0.03 K/UL (ref 0–0.3)
ABSOLUTE LYMPH #: 1.27 K/UL (ref 1.1–3.7)
ABSOLUTE MONO #: 0.54 K/UL (ref 0.1–1.2)
ALBUMIN SERPL-MCNC: 4 G/DL (ref 3.5–5.2)
ALBUMIN/GLOBULIN RATIO: 1.4 (ref 1–2.5)
ALP BLD-CCNC: 66 U/L (ref 35–104)
ALT SERPL-CCNC: 14 U/L (ref 5–33)
ANION GAP SERPL CALCULATED.3IONS-SCNC: 12 MMOL/L (ref 9–17)
AST SERPL-CCNC: 19 U/L
BASOPHILS # BLD: 0 % (ref 0–2)
BASOPHILS ABSOLUTE: 0.03 K/UL (ref 0–0.2)
BILIRUB SERPL-MCNC: 0.44 MG/DL (ref 0.3–1.2)
BUN BLDV-MCNC: 16 MG/DL (ref 8–23)
BUN/CREAT BLD: ABNORMAL (ref 9–20)
CALCIUM SERPL-MCNC: 10 MG/DL (ref 8.6–10.4)
CHLORIDE BLD-SCNC: 105 MMOL/L (ref 98–107)
CO2: 26 MMOL/L (ref 20–31)
CREAT SERPL-MCNC: 0.98 MG/DL (ref 0.5–0.9)
DIFFERENTIAL TYPE: ABNORMAL
EOSINOPHILS RELATIVE PERCENT: 2 % (ref 1–4)
GFR AFRICAN AMERICAN: >60 ML/MIN
GFR NON-AFRICAN AMERICAN: 54 ML/MIN
GFR SERPL CREATININE-BSD FRML MDRD: ABNORMAL ML/MIN/{1.73_M2}
GFR SERPL CREATININE-BSD FRML MDRD: ABNORMAL ML/MIN/{1.73_M2}
GLUCOSE BLD-MCNC: 96 MG/DL (ref 70–99)
HCT VFR BLD CALC: 44 % (ref 36.3–47.1)
HEMOGLOBIN: 14 G/DL (ref 11.9–15.1)
IMMATURE GRANULOCYTES: 0 %
LYMPHOCYTES # BLD: 19 % (ref 24–43)
MCH RBC QN AUTO: 30 PG (ref 25.2–33.5)
MCHC RBC AUTO-ENTMCNC: 31.8 G/DL (ref 28.4–34.8)
MCV RBC AUTO: 94.4 FL (ref 82.6–102.9)
MONOCYTES # BLD: 8 % (ref 3–12)
NRBC AUTOMATED: 0 PER 100 WBC
PDW BLD-RTO: 13.8 % (ref 11.8–14.4)
PLATELET # BLD: 252 K/UL (ref 138–453)
PLATELET ESTIMATE: ABNORMAL
PMV BLD AUTO: 9.6 FL (ref 8.1–13.5)
POTASSIUM SERPL-SCNC: 4.3 MMOL/L (ref 3.7–5.3)
RBC # BLD: 4.66 M/UL (ref 3.95–5.11)
RBC # BLD: ABNORMAL 10*6/UL
SEG NEUTROPHILS: 71 % (ref 36–65)
SEGMENTED NEUTROPHILS ABSOLUTE COUNT: 4.86 K/UL (ref 1.5–8.1)
SODIUM BLD-SCNC: 143 MMOL/L (ref 135–144)
TOTAL PROTEIN: 6.9 G/DL (ref 6.4–8.3)
WBC # BLD: 6.8 K/UL (ref 3.5–11.3)
WBC # BLD: ABNORMAL 10*3/UL

## 2019-04-16 ENCOUNTER — OFFICE VISIT (OUTPATIENT)
Dept: INTERNAL MEDICINE CLINIC | Age: 84
End: 2019-04-16
Payer: MEDICARE

## 2019-04-16 VITALS
WEIGHT: 128.8 LBS | BODY MASS INDEX: 23.7 KG/M2 | SYSTOLIC BLOOD PRESSURE: 132 MMHG | HEIGHT: 62 IN | DIASTOLIC BLOOD PRESSURE: 74 MMHG | TEMPERATURE: 98.2 F | RESPIRATION RATE: 16 BRPM | HEART RATE: 68 BPM

## 2019-04-16 DIAGNOSIS — E78.00 HIGH CHOLESTEROL: ICD-10-CM

## 2019-04-16 DIAGNOSIS — Z95.2 S/P AVR (AORTIC VALVE REPLACEMENT): ICD-10-CM

## 2019-04-16 DIAGNOSIS — I10 ESSENTIAL HYPERTENSION: Primary | ICD-10-CM

## 2019-04-16 PROCEDURE — 99214 OFFICE O/P EST MOD 30 MIN: CPT | Performed by: INTERNAL MEDICINE

## 2019-04-16 RX ORDER — SPIRONOLACTONE 50 MG/1
50 TABLET, FILM COATED ORAL DAILY
COMMUNITY

## 2019-04-16 ASSESSMENT — PATIENT HEALTH QUESTIONNAIRE - PHQ9
SUM OF ALL RESPONSES TO PHQ QUESTIONS 1-9: 0
SUM OF ALL RESPONSES TO PHQ9 QUESTIONS 1 & 2: 0
1. LITTLE INTEREST OR PLEASURE IN DOING THINGS: 0
2. FEELING DOWN, DEPRESSED OR HOPELESS: 0
SUM OF ALL RESPONSES TO PHQ QUESTIONS 1-9: 0

## 2019-04-16 NOTE — PROGRESS NOTES
Visit Information    Have you changed or started any medications since your last visit including any over-the-counter medicines, vitamins, or herbal medicines? yes - see list   Are you having any side effects from any of your medications? -  no  Have you stopped taking any of your medications? Is so, why? -  no    Have you seen any other physician or provider since your last visit? Yes - Records Obtained  Have you had any other diagnostic tests since your last visit? Yes - Records Obtained  Have you been seen in the emergency room and/or had an admission to a hospital since we last saw you? No  Have you had your routine dental cleaning in the past 6 months? yes -     Have you activated your Wylio account? If not, what are your barriers?  Yes     Patient Care Team:  Laura Key MD as PCP - General (Internal Medicine)  Laura Key MD as PCP - [facility] Attributed Provider    Medical History Review  Past Medical, Family, and Social History reviewed and does contribute to the patient presenting condition    Health Maintenance   Topic Date Due    TSH testing  03/04/1932    DTaP/Tdap/Td vaccine (1 - Tdap) 03/04/1951    Shingles Vaccine (1 of 2) 04/26/2019 (Originally 3/4/1982)    Potassium monitoring  03/27/2020    Creatinine monitoring  03/27/2020    Flu vaccine  Completed    Pneumococcal 65+ years Vaccine  Completed

## 2019-04-16 NOTE — PROGRESS NOTES
Daniel Lara is a 80 y.o. female who presents for   Chief Complaint   Patient presents with    Hypertension     4 mo check up, denies complaints, did see alin Castillo [facility]    Hyperlipidemia     as above    and follow up of chronic medical problems. Patient Active Problem List   Diagnosis    Hypertension    Hyperlipidemia     HPI  Here for follow-up on blood pressure and cholesterol denies any new complaints    Current Outpatient Medications   Medication Sig Dispense Refill    spironolactone (ALDACTONE) 50 MG tablet Take 50 mg by mouth daily      loratadine (CLARITIN) 10 MG tablet Take 1 tablet by mouth daily 90 tablet 1    latanoprost (XALATAN) 0.005 % ophthalmic solution Place 1 drop into both eyes nightly      ranitidine (ZANTAC) 150 MG tablet Take 150 mg by mouth 2 times daily      vitamin E 400 UNIT capsule Take 400 Units by mouth daily      losartan (COZAAR) 100 MG tablet Take 100 mg by mouth daily      amiodarone (CORDARONE) 200 MG tablet Take 100 mg by mouth daily       atorvastatin (LIPITOR) 20 MG tablet Take 20 mg by mouth daily      carvedilol (COREG) 25 MG tablet Take 25 mg by mouth 2 times daily       fluticasone (FLONASE) 50 MCG/ACT nasal spray 2 sprays by Nasal route daily (Patient taking differently: 2 sprays by Nasal route daily as needed ) 1 Bottle 0    aspirin 81 MG tablet Take 81 mg by mouth daily.  Multiple Vitamin (MULTI-VITAMIN DAILY PO) Take 1 tablet by mouth daily       calcium carbonate-vitamin D (CALCIUM + D) 600-200 MG-UNIT TABS Take 1 tablet by mouth 2 times daily        No current facility-administered medications for this visit.         No Known Allergies    Past Medical History:   Diagnosis Date    GERD (gastroesophageal reflux disease)     H/O aortic valve replacement 07/26/2018    Dr. Melvin Powell    Hyperlipidemia     Hypertension        Past Surgical History:   Procedure Laterality Date    CARDIAC SURGERY      aortic valve replacement 7/26/18    HYSTERECTOMY      JOINT REPLACEMENT      rt hip    TONSILLECTOMY         Family History   Problem Relation Age of Onset    High Blood Pressure Mother     Diabetes Sister     Cancer Sister      ROS   Constitutional:  Negative for fatigue, loss of appetite and unexpected weight change   HEENT            : Negative for neck stiffness and pain, no congestion or sinus pressure   Eyes                : No visual disturbance or pain   Cardiovascular: No chest pain or palpitations or leg swelling   Respiratory      : Negative for cough, shortness of breath or wheezing   Gastrointestinal: Negative for abdominal pain, constipation or diarrhea and bloating No nausea or vomiting   Genitourinary:     No urgency or frequency, no burning or hematuria   Musculoskeletal: No arthralgias, back pain or myalgias   Skin                  : Negative for rash or erythema   Neurological    : Negative for dizziness, weakness, tremors ,light headedness or syncope   Psychiatric       : Negative for dysphoric mood, sleep disturbances, nervous or anxious, or decreased concentration   All other review of systems was negative    Objective  Physical Examination:    Nursing note reviewed    /74 (Site: Right Upper Arm, Position: Sitting, Cuff Size: Medium Adult)   Pulse 68   Temp 98.2 °F (36.8 °C) (Oral)   Resp 16   Ht 5' 2\" (1.575 m)   Wt 128 lb 12.8 oz (58.4 kg)   BMI 23.56 kg/m²   BP Readings from Last 3 Encounters:   04/16/19 132/74   12/29/18 (!) 158/67   12/17/18 122/66         Constitutional:  Cleatrice is oriented to place, person and time ,appears well-developed and well-nourished  HEENT:  Atraumatic and normocephalic, external ears normal bilaterally, nose normal no oropharyngeal exudate and is clear and moist  Eyes:  EOCM normal; conjunctivae normal; PERRLA bilaterally  Neck:  Normal range of motion, neck supple, no JVD and no thyromegaly  Cardiovascular:  RRR, normal heart sounds and intact distal

## 2019-09-05 ENCOUNTER — HOSPITAL ENCOUNTER (OUTPATIENT)
Age: 84
Setting detail: SPECIMEN
Discharge: HOME | End: 2019-09-05
Payer: MEDICARE

## 2019-09-05 LAB
ABSOLUTE EOS #: 0.21 K/UL (ref 0–0.44)
ABSOLUTE IMMATURE GRANULOCYTE: <0.03 K/UL (ref 0–0.3)
ABSOLUTE LYMPH #: 0.91 K/UL (ref 1.1–3.7)
ABSOLUTE MONO #: 0.54 K/UL (ref 0.1–1.2)
ALBUMIN SERPL-MCNC: 4.1 G/DL (ref 3.5–5.2)
ALBUMIN/GLOBULIN RATIO: 1.3 (ref 1–2.5)
ALP BLD-CCNC: 70 U/L (ref 35–104)
ALT SERPL-CCNC: 14 U/L (ref 5–33)
ANION GAP SERPL CALCULATED.3IONS-SCNC: 14 MMOL/L (ref 9–17)
AST SERPL-CCNC: 23 U/L
BASOPHILS # BLD: 1 % (ref 0–2)
BASOPHILS ABSOLUTE: 0.03 K/UL (ref 0–0.2)
BILIRUB SERPL-MCNC: 0.51 MG/DL (ref 0.3–1.2)
BUN BLDV-MCNC: 13 MG/DL (ref 8–23)
BUN/CREAT BLD: ABNORMAL (ref 9–20)
CALCIUM SERPL-MCNC: 9.5 MG/DL (ref 8.6–10.4)
CHLORIDE BLD-SCNC: 104 MMOL/L (ref 98–107)
CHOLESTEROL/HDL RATIO: 2.4
CHOLESTEROL: 125 MG/DL
CO2: 26 MMOL/L (ref 20–31)
CREAT SERPL-MCNC: 0.89 MG/DL (ref 0.5–0.9)
DIFFERENTIAL TYPE: ABNORMAL
EOSINOPHILS RELATIVE PERCENT: 3 % (ref 1–4)
GFR AFRICAN AMERICAN: >60 ML/MIN
GFR NON-AFRICAN AMERICAN: >60 ML/MIN
GFR SERPL CREATININE-BSD FRML MDRD: ABNORMAL ML/MIN/{1.73_M2}
GFR SERPL CREATININE-BSD FRML MDRD: ABNORMAL ML/MIN/{1.73_M2}
GLUCOSE BLD-MCNC: 100 MG/DL (ref 70–99)
HCT VFR BLD CALC: 45.9 % (ref 36.3–47.1)
HDLC SERPL-MCNC: 52 MG/DL
HEMOGLOBIN: 14 G/DL (ref 11.9–15.1)
IMMATURE GRANULOCYTES: 0 %
LDL CHOLESTEROL: 61 MG/DL (ref 0–130)
LYMPHOCYTES # BLD: 14 % (ref 24–43)
MCH RBC QN AUTO: 29.4 PG (ref 25.2–33.5)
MCHC RBC AUTO-ENTMCNC: 30.5 G/DL (ref 28.4–34.8)
MCV RBC AUTO: 96.4 FL (ref 82.6–102.9)
MONOCYTES # BLD: 9 % (ref 3–12)
NRBC AUTOMATED: 0 PER 100 WBC
PDW BLD-RTO: 13.3 % (ref 11.8–14.4)
PLATELET # BLD: 232 K/UL (ref 138–453)
PLATELET ESTIMATE: ABNORMAL
PMV BLD AUTO: 9.8 FL (ref 8.1–13.5)
POTASSIUM SERPL-SCNC: 4.8 MMOL/L (ref 3.7–5.3)
RBC # BLD: 4.76 M/UL (ref 3.95–5.11)
RBC # BLD: ABNORMAL 10*6/UL
SEG NEUTROPHILS: 73 % (ref 36–65)
SEGMENTED NEUTROPHILS ABSOLUTE COUNT: 4.59 K/UL (ref 1.5–8.1)
SODIUM BLD-SCNC: 144 MMOL/L (ref 135–144)
TOTAL CK: 49 U/L (ref 26–192)
TOTAL PROTEIN: 7.2 G/DL (ref 6.4–8.3)
TRIGL SERPL-MCNC: 58 MG/DL
VLDLC SERPL CALC-MCNC: NORMAL MG/DL (ref 1–30)
WBC # BLD: 6.3 K/UL (ref 3.5–11.3)
WBC # BLD: ABNORMAL 10*3/UL

## 2019-09-16 RX ORDER — RANITIDINE 150 MG/1
TABLET ORAL
Qty: 60 TABLET | Refills: 1 | Status: SHIPPED | OUTPATIENT
Start: 2019-09-16 | End: 2019-11-10 | Stop reason: SDUPTHER

## 2019-10-10 ENCOUNTER — OFFICE VISIT (OUTPATIENT)
Dept: INTERNAL MEDICINE CLINIC | Age: 84
End: 2019-10-10
Payer: MEDICARE

## 2019-10-10 VITALS
HEIGHT: 62 IN | HEART RATE: 72 BPM | SYSTOLIC BLOOD PRESSURE: 140 MMHG | WEIGHT: 127.8 LBS | BODY MASS INDEX: 23.52 KG/M2 | RESPIRATION RATE: 16 BRPM | DIASTOLIC BLOOD PRESSURE: 60 MMHG | TEMPERATURE: 97.8 F

## 2019-10-10 DIAGNOSIS — I10 ESSENTIAL HYPERTENSION: Primary | ICD-10-CM

## 2019-10-10 DIAGNOSIS — K21.9 GASTROESOPHAGEAL REFLUX DISEASE WITHOUT ESOPHAGITIS: ICD-10-CM

## 2019-10-10 DIAGNOSIS — E78.00 HIGH CHOLESTEROL: ICD-10-CM

## 2019-10-10 PROCEDURE — 99214 OFFICE O/P EST MOD 30 MIN: CPT | Performed by: INTERNAL MEDICINE

## 2019-10-10 RX ORDER — LOSARTAN POTASSIUM 50 MG/1
50 TABLET ORAL DAILY
COMMUNITY
End: 2021-09-08 | Stop reason: DRUGHIGH

## 2019-11-11 RX ORDER — RANITIDINE 150 MG/1
TABLET ORAL
Qty: 60 TABLET | Refills: 5 | Status: SHIPPED | OUTPATIENT
Start: 2019-11-11 | End: 2021-09-08

## 2020-05-18 ENCOUNTER — HOSPITAL ENCOUNTER (OUTPATIENT)
Age: 85
Setting detail: SPECIMEN
Discharge: HOME | End: 2020-05-18
Payer: MEDICARE

## 2020-05-18 LAB
ALBUMIN SERPL-MCNC: 4.1 G/DL (ref 3.5–5.2)
ALBUMIN/GLOBULIN RATIO: 1.5 (ref 1–2.5)
ALP BLD-CCNC: 90 U/L (ref 35–104)
ALT SERPL-CCNC: 14 U/L (ref 5–33)
ANION GAP SERPL CALCULATED.3IONS-SCNC: 12 MMOL/L (ref 9–17)
AST SERPL-CCNC: 19 U/L
BILIRUB SERPL-MCNC: 0.4 MG/DL (ref 0.3–1.2)
BUN BLDV-MCNC: 15 MG/DL (ref 8–23)
BUN/CREAT BLD: NORMAL (ref 9–20)
CALCIUM SERPL-MCNC: 9.8 MG/DL (ref 8.6–10.4)
CHLORIDE BLD-SCNC: 101 MMOL/L (ref 98–107)
CHOLESTEROL/HDL RATIO: 2.5
CHOLESTEROL: 136 MG/DL
CO2: 28 MMOL/L (ref 20–31)
CREAT SERPL-MCNC: 0.88 MG/DL (ref 0.5–0.9)
GFR AFRICAN AMERICAN: >60 ML/MIN
GFR NON-AFRICAN AMERICAN: >60 ML/MIN
GFR SERPL CREATININE-BSD FRML MDRD: NORMAL ML/MIN/{1.73_M2}
GFR SERPL CREATININE-BSD FRML MDRD: NORMAL ML/MIN/{1.73_M2}
GLUCOSE BLD-MCNC: 96 MG/DL (ref 70–99)
HDLC SERPL-MCNC: 55 MG/DL
LDL CHOLESTEROL: 63 MG/DL (ref 0–130)
POTASSIUM SERPL-SCNC: 4.1 MMOL/L (ref 3.7–5.3)
SODIUM BLD-SCNC: 141 MMOL/L (ref 135–144)
TOTAL CK: 26 U/L (ref 26–192)
TOTAL PROTEIN: 6.8 G/DL (ref 6.4–8.3)
TRIGL SERPL-MCNC: 91 MG/DL
VLDLC SERPL CALC-MCNC: NORMAL MG/DL (ref 1–30)

## 2020-07-16 ENCOUNTER — TELEPHONE (OUTPATIENT)
Dept: FAMILY MEDICINE CLINIC | Age: 85
End: 2020-07-16

## 2020-07-16 NOTE — TELEPHONE ENCOUNTER
Sabrina Scott was contacted to set up an annual wellness visit    Spoke with: left message to schedule awv     Alanis Blum

## 2020-07-28 ENCOUNTER — TELEPHONE (OUTPATIENT)
Dept: PHARMACY | Facility: CLINIC | Age: 85
End: 2020-07-28

## 2020-07-28 NOTE — TELEPHONE ENCOUNTER
CLINICAL PHARMACY: ADHERENCE REVIEW  Identified care gap per [employer]; fills at [facility]: ACE/ARB adherence    Last Office Visit: unable to determine in this EMR; prescriber Jm Bullock MD,[facility]) is external cardiologist (3/6/20 per scanned Media tab document)    Patient also appears to be taking: statin     ASSESSMENT  ACE/ARB ADHERENCE  PDC: 87% in 2019; 1st fill YTD per [employer] report as of 7/5/20    Per [facility]   Losartan 100mg daily last filled on 3/7/20 for a 90 day supply (reports they do NOT have any rx's for 50mg daily for this patient); 0 refills remaining. Also note, [facility] reports they do NOT currently have losartan 100mg tabs available and have <50 of 50mg tabs on hand - they have all strengths of irbesartan or valsartan reliably in stock. NOTE: per scanned 3/6/20 cardiology note: losartan 50mg daily    BP Readings from Last 3 Encounters:   10/10/19 (!) 140/60   04/16/19 132/74   12/29/18 (!) 158/67     CrCl cannot be calculated (Unknown ideal weight.). STATIN ADHERENCE  PDC: 99% in 2019; 100% YTD    Per Insurance Records and Reconciled Dispense Report   Atorvastatin 20mg daily filled on 2/11/20 & 5/7/20 for a 90 day supply    Lab Results   Component Value Date    CHOL 136 05/18/2020    TRIG 91 05/18/2020    HDL 55 05/18/2020    LDLCHOLESTEROL 63 05/18/2020     ALT   Date Value Ref Range Status   05/18/2020 14 5 - 33 U/L Final     AST   Date Value Ref Range Status   05/18/2020 19 <32 U/L Final     The ASCVD Risk score (Hetal Rowland. et al., 2013) failed to calculate for the following reasons: The 2013 ASCVD risk score is only valid for ages 36 to 78       PLAN  Reached patient to review. Confirms she is taking 1/2 tablet, or losartan 50mg daily now. Counted her tablets - states she has enough left for 59 days. She would be ok with getting a refill now for the losartan 50mg tabs, whatever is available, while the tablets are in stock.   Or, if not able to, is ok using up what she has and reviewing with provider office when her current supply is almost completed    Called and spoke to patient's cardiology office.  Jose Alfredo Walton confirms they have losartan 50mg daily instructions and will have their office [facility].     Paz Sutton, PharmD, [facility]  Direct: [phone]  Department, toll free: [phone], option 7

## 2020-07-29 NOTE — TELEPHONE ENCOUNTER
Called patient's [facility] - they confirm losartan 50mg rx received yesterday and they have #90 ready for patient to . Called and advised patient who states she will  by end of week. Reviewed that it will then be ONE tablet daily, since will be the 50mg tabs - patient verbalizes understanding.    =======================================================   For Pharmacy Admin Tracking Only    PHSO: Yes  Total # of Interventions Recommended: 1  - Decreased Dose #: 1  - New Order #: 0 New Medication Order Reason(s):  Adherence  - Refills Provided #: 1  - Maintenance Safety Lab Monitoring #: 1  - New Therapy Lab Monitoring #: 1  Recommended intervention potential cost savings: 1  Total Interventions Accepted: 1  Time Spent (min): 30

## 2020-10-29 ENCOUNTER — HOSPITAL ENCOUNTER (OUTPATIENT)
Age: 85
Setting detail: SPECIMEN
Discharge: HOME | End: 2020-10-29
Payer: MEDICARE

## 2020-10-29 LAB
ALBUMIN SERPL-MCNC: 3.9 G/DL (ref 3.5–5.2)
ALBUMIN/GLOBULIN RATIO: 1.4 (ref 1–2.5)
ALP BLD-CCNC: 84 U/L (ref 35–104)
ALT SERPL-CCNC: 15 U/L (ref 5–33)
ANION GAP SERPL CALCULATED.3IONS-SCNC: 11 MMOL/L (ref 9–17)
AST SERPL-CCNC: 22 U/L
BILIRUB SERPL-MCNC: 0.55 MG/DL (ref 0.3–1.2)
BUN BLDV-MCNC: 12 MG/DL (ref 8–23)
BUN/CREAT BLD: NORMAL (ref 9–20)
CALCIUM SERPL-MCNC: 9.6 MG/DL (ref 8.6–10.4)
CHLORIDE BLD-SCNC: 105 MMOL/L (ref 98–107)
CHOLESTEROL/HDL RATIO: 2.1
CHOLESTEROL: 117 MG/DL
CO2: 25 MMOL/L (ref 20–31)
CREAT SERPL-MCNC: 0.79 MG/DL (ref 0.5–0.9)
GFR AFRICAN AMERICAN: >60 ML/MIN
GFR NON-AFRICAN AMERICAN: >60 ML/MIN
GFR SERPL CREATININE-BSD FRML MDRD: NORMAL ML/MIN/{1.73_M2}
GFR SERPL CREATININE-BSD FRML MDRD: NORMAL ML/MIN/{1.73_M2}
GLUCOSE BLD-MCNC: 98 MG/DL (ref 70–99)
HDLC SERPL-MCNC: 55 MG/DL
LDL CHOLESTEROL: 47 MG/DL (ref 0–130)
POTASSIUM SERPL-SCNC: 4 MMOL/L (ref 3.7–5.3)
SODIUM BLD-SCNC: 141 MMOL/L (ref 135–144)
TOTAL CK: 31 U/L (ref 26–192)
TOTAL PROTEIN: 6.6 G/DL (ref 6.4–8.3)
TRIGL SERPL-MCNC: 74 MG/DL
VLDLC SERPL CALC-MCNC: NORMAL MG/DL (ref 1–30)

## 2021-04-16 ENCOUNTER — HOSPITAL ENCOUNTER (OUTPATIENT)
Age: 86
Setting detail: SPECIMEN
Discharge: HOME | End: 2021-04-16
Payer: MEDICARE

## 2021-04-16 LAB
ALBUMIN SERPL-MCNC: 4.1 G/DL (ref 3.5–5.2)
ALBUMIN/GLOBULIN RATIO: 1.5 (ref 1–2.5)
ALP BLD-CCNC: 81 U/L (ref 35–104)
ALT SERPL-CCNC: 16 U/L (ref 5–33)
ANION GAP SERPL CALCULATED.3IONS-SCNC: 11 MMOL/L (ref 9–17)
AST SERPL-CCNC: 23 U/L
BILIRUB SERPL-MCNC: 0.46 MG/DL (ref 0.3–1.2)
BUN BLDV-MCNC: 14 MG/DL (ref 8–23)
BUN/CREAT BLD: ABNORMAL (ref 9–20)
CALCIUM SERPL-MCNC: 9.5 MG/DL (ref 8.6–10.4)
CHLORIDE BLD-SCNC: 105 MMOL/L (ref 98–107)
CHOLESTEROL/HDL RATIO: 2.1
CHOLESTEROL: 117 MG/DL
CO2: 27 MMOL/L (ref 20–31)
CREAT SERPL-MCNC: 0.77 MG/DL (ref 0.5–0.9)
GFR AFRICAN AMERICAN: >60 ML/MIN
GFR NON-AFRICAN AMERICAN: >60 ML/MIN
GFR SERPL CREATININE-BSD FRML MDRD: ABNORMAL ML/MIN/{1.73_M2}
GFR SERPL CREATININE-BSD FRML MDRD: ABNORMAL ML/MIN/{1.73_M2}
GLUCOSE BLD-MCNC: 104 MG/DL (ref 70–99)
HDLC SERPL-MCNC: 57 MG/DL
LDL CHOLESTEROL: 47 MG/DL (ref 0–130)
POTASSIUM SERPL-SCNC: 4.1 MMOL/L (ref 3.7–5.3)
SODIUM BLD-SCNC: 143 MMOL/L (ref 135–144)
TOTAL CK: 43 U/L (ref 26–192)
TOTAL PROTEIN: 6.8 G/DL (ref 6.4–8.3)
TRIGL SERPL-MCNC: 66 MG/DL
VLDLC SERPL CALC-MCNC: NORMAL MG/DL (ref 1–30)

## 2021-09-08 ENCOUNTER — OFFICE VISIT (OUTPATIENT)
Dept: INTERNAL MEDICINE CLINIC | Age: 86
End: 2021-09-08
Payer: MEDICARE

## 2021-09-08 VITALS
BODY MASS INDEX: 23.86 KG/M2 | HEIGHT: 61 IN | OXYGEN SATURATION: 99 % | TEMPERATURE: 97.5 F | RESPIRATION RATE: 24 BRPM | SYSTOLIC BLOOD PRESSURE: 170 MMHG | HEART RATE: 89 BPM | WEIGHT: 126.4 LBS | DIASTOLIC BLOOD PRESSURE: 90 MMHG

## 2021-09-08 DIAGNOSIS — I10 ESSENTIAL HYPERTENSION: Primary | ICD-10-CM

## 2021-09-08 DIAGNOSIS — E78.00 HIGH CHOLESTEROL: ICD-10-CM

## 2021-09-08 DIAGNOSIS — Z95.2 S/P AVR: ICD-10-CM

## 2021-09-08 PROCEDURE — 99214 OFFICE O/P EST MOD 30 MIN: CPT | Performed by: INTERNAL MEDICINE

## 2021-09-08 PROCEDURE — 90694 VACC AIIV4 NO PRSRV 0.5ML IM: CPT | Performed by: INTERNAL MEDICINE

## 2021-09-08 PROCEDURE — G0008 ADMIN INFLUENZA VIRUS VAC: HCPCS | Performed by: INTERNAL MEDICINE

## 2021-09-08 RX ORDER — GLIMEPIRIDE 2 MG/1
TABLET ORAL
COMMUNITY
Start: 2021-07-26

## 2021-09-08 RX ORDER — LOSARTAN POTASSIUM 100 MG/1
TABLET ORAL
COMMUNITY
Start: 2021-07-26

## 2021-09-08 SDOH — ECONOMIC STABILITY: FOOD INSECURITY: WITHIN THE PAST 12 MONTHS, THE FOOD YOU BOUGHT JUST DIDN'T LAST AND YOU DIDN'T HAVE MONEY TO GET MORE.: NEVER TRUE

## 2021-09-08 SDOH — ECONOMIC STABILITY: FOOD INSECURITY: WITHIN THE PAST 12 MONTHS, YOU WORRIED THAT YOUR FOOD WOULD RUN OUT BEFORE YOU GOT MONEY TO BUY MORE.: NEVER TRUE

## 2021-09-08 ASSESSMENT — PATIENT HEALTH QUESTIONNAIRE - PHQ9
SUM OF ALL RESPONSES TO PHQ9 QUESTIONS 1 & 2: 0
SUM OF ALL RESPONSES TO PHQ QUESTIONS 1-9: 0
SUM OF ALL RESPONSES TO PHQ QUESTIONS 1-9: 0
1. LITTLE INTEREST OR PLEASURE IN DOING THINGS: 0
SUM OF ALL RESPONSES TO PHQ QUESTIONS 1-9: 0
2. FEELING DOWN, DEPRESSED OR HOPELESS: 0

## 2021-09-08 ASSESSMENT — SOCIAL DETERMINANTS OF HEALTH (SDOH): HOW HARD IS IT FOR YOU TO PAY FOR THE VERY BASICS LIKE FOOD, HOUSING, MEDICAL CARE, AND HEATING?: NOT HARD AT ALL

## 2021-09-08 NOTE — LETTER
[facility]  [address] [place] [address]  Phone: [phone]  Fax: [phone]    Elkin Silverman MD         September 8, 2021     Patient: Sherri Bradley   YOB: 1932   Date of Visit: 9/8/2021       To Whom It May Concern: It is my medical opinion that Dalia Pruitt requires a disability parking placard for the following reasons:  She has a cardiac condition to the extent that functional limitations are classified in severity as class III, according to standards accepted by the American [employer] Association. Duration of need: 5 years    If you have any questions or concerns, please don't hesitate to call.     Sincerely,        Elkin Silverman MD

## 2021-09-08 NOTE — PROGRESS NOTES
Nara Ortez is a 80 y.o. female who presents for   Chief Complaint   Patient presents with    Follow-up     hypertension; would like to get flu vaccine    Health Maintenance     tdap, shingles, awv, flu    and follow up of chronic medical problems. Patient Active Problem List   Diagnosis    Hypertension    Hyperlipidemia     HPI  Here for follow-up on blood pressure and cholesterol denies any new complaints and wants handicap parking permit and states that she saw cardiology in March and her blood pressure was elevated so her losartan was increased to 100 mg at that time but patient stated that her blood pressure was running good all the time and so she  stayed at 50 mg daily    Current Outpatient Medications   Medication Sig Dispense Refill    timolol (TIMOPTIC) 0.25 % ophthalmic solution PLACE ONE DROP INTO BOTH EYES IN THE MORNING.  losartan (COZAAR) 100 MG tablet TAKE 1 TABLET BY MOUTH EVERY DAY      spironolactone (ALDACTONE) 50 MG tablet Take 50 mg by mouth daily      loratadine (CLARITIN) 10 MG tablet Take 1 tablet by mouth daily 90 tablet 1    latanoprost (XALATAN) 0.005 % ophthalmic solution Place 1 drop into both eyes nightly      vitamin E 400 UNIT capsule Take 400 Units by mouth daily      atorvastatin (LIPITOR) 20 MG tablet Take 20 mg by mouth daily      carvedilol (COREG) 25 MG tablet Take 25 mg by mouth 2 times daily       fluticasone (FLONASE) 50 MCG/ACT nasal spray 2 sprays by Nasal route daily (Patient taking differently: 2 sprays by Nasal route daily as needed ) 1 Bottle 0    aspirin 81 MG tablet Take 81 mg by mouth daily.  Multiple Vitamin (MULTI-VITAMIN DAILY PO) Take 1 tablet by mouth daily       calcium carbonate-vitamin D (CALCIUM + D) 600-200 MG-UNIT TABS Take 1 tablet by mouth 2 times daily        No current facility-administered medications for this visit.        No Known Allergies    Past Medical History:   Diagnosis Date    GERD (gastroesophageal reflux disease)     H/O aortic valve replacement 07/26/2018    Dr. Merrill Hummel Hyperlipidemia     Hypertension        Past Surgical History:   Procedure Laterality Date    CARDIAC SURGERY      aortic valve replacement 7/26/18    HYSTERECTOMY      JOINT REPLACEMENT      rt hip    TONSILLECTOMY         Family History   Problem Relation Age of Onset    High Blood Pressure Mother     Diabetes Sister    Dossie Angy Cancer Sister      ROS   Constitutional:  Negative for fatigue, loss of appetite and unexpected weight change   HEENT            : Negative for neck stiffness and pain, no congestion or sinus pressure   Eyes                : No visual disturbance or pain   Cardiovascular: No chest pain or palpitations or leg swelling   Respiratory      : Negative for cough, shortness of breath or wheezing   Gastrointestinal: Negative for abdominal pain, constipation or diarrhea and bloating No nausea or vomiting   Genitourinary:     No urgency or frequency, no burning or hematuria   Musculoskeletal: No arthralgias, back pain or myalgias   Skin                  : Negative for rash or erythema   Neurological    : Negative for dizziness, weakness, tremors ,light headedness or syncope   Psychiatric       : Negative for dysphoric mood, sleep disturbances, nervous or anxious, or decreased concentration   All other review of systems was negative    Objective  Physical Examination:    Nursing note reviewed    BP (!) 170/90 (Site: Right Upper Arm, Position: Sitting, Cuff Size: Medium Adult)   Pulse 89   Temp 97.5 °F (36.4 °C) (Temporal)   Resp 24   Ht 5' 1.5\" (1.562 m)   Wt 126 lb 6.4 oz (57.3 kg)   SpO2 99%   Breastfeeding No   BMI 23.50 kg/m²   BP Readings from Last 3 Encounters:   09/08/21 (!) 170/90   10/10/19 (!) 140/60   04/16/19 132/74         Constitutional:  Dalia is oriented to place, person and time ,appears well-developed and well-nourished  HEENT:  Atraumatic and normocephalic, external ears normal bilaterally, nose normal no oropharyngeal exudate and is clear and moist  Eyes:  EOCM normal; conjunctivae normal; PERRLA bilaterally  Neck:  Normal range of motion, neck supple, no JVD and no thyromegaly  Cardiovascular:  RRR, normal heart sounds and intact distal pulses  Pulmonary:  effort normal and breath sounds normal bilaterally,no wheezes or rales, no respiratory distress  Abdominal:  Soft, non-tender; normal bowel sounds, no masses  Musculoskeletal:  Normal range of motion and no edema or tenderness bilaterally  No lymphadenopathy  Neurological:  alert, oriented, and normal reflexes bilaterally  Skin: warm and dry  Psychiatric:  normal mood and effect; behavior normal.    Labs:   No results found for: LABA1C  Lab Results   Component Value Date    CHOL 117 04/16/2021     Lab Results   Component Value Date    HDL 57 04/16/2021     No results found for: [address]  Lab Results   Component Value Date    TRIG 66 04/16/2021     No components found for: [place], [place]  Lab Results   Component Value Date    WBC 6.3 09/05/2019    HGB 14.0 09/05/2019    HCT 45.9 09/05/2019    MCV 96.4 09/05/2019     09/05/2019     Lab Results   Component Value Date    INR 2.5 12/29/2018    PROTIME 24.8 (H) 12/29/2018     Lab Results   Component Value Date    GLUCOSE 104 (H) 04/16/2021    CREATININE 0.77 04/16/2021    BUN 14 04/16/2021     04/16/2021    K 4.1 04/16/2021     04/16/2021    CO2 27 04/16/2021     Lab Results   Component Value Date    ALT 16 04/16/2021    AST 23 04/16/2021    ALKPHOS 81 04/16/2021    BILITOT 0.46 04/16/2021     Lab Results   Component Value Date    LABPROT 7.2 02/13/2013    LABALBU 4.1 04/16/2021     No results found for: TSH, CBC  Assessment:  1. Essential hypertension    2. High cholesterol    3.  S/P AVR        Plan:  Blood pressure is elevated and patient was advised to increase her losartan 100 mg as recommended by cardiology and follow-up with them at the end of this month  Patient stated that

## 2021-10-20 ENCOUNTER — HOSPITAL ENCOUNTER (OUTPATIENT)
Age: 86
Setting detail: SPECIMEN
Discharge: HOME | End: 2021-10-20
Payer: MEDICARE

## 2021-10-20 LAB
ALBUMIN SERPL-MCNC: 4.2 G/DL (ref 3.5–5.2)
ALBUMIN/GLOBULIN RATIO: 1.6 (ref 1–2.5)
ALP BLD-CCNC: 86 U/L (ref 35–104)
ALT SERPL-CCNC: 15 U/L (ref 5–33)
ANION GAP SERPL CALCULATED.3IONS-SCNC: 12 MMOL/L (ref 9–17)
AST SERPL-CCNC: 21 U/L
BILIRUB SERPL-MCNC: 0.54 MG/DL (ref 0.3–1.2)
BUN BLDV-MCNC: 10 MG/DL (ref 8–23)
BUN/CREAT BLD: ABNORMAL (ref 9–20)
CALCIUM SERPL-MCNC: 9.4 MG/DL (ref 8.6–10.4)
CHLORIDE BLD-SCNC: 103 MMOL/L (ref 98–107)
CHOLESTEROL, FASTING: 122 MG/DL
CHOLESTEROL/HDL RATIO: 2.3
CO2: 27 MMOL/L (ref 20–31)
CREAT SERPL-MCNC: 0.72 MG/DL (ref 0.5–0.9)
GFR AFRICAN AMERICAN: >60 ML/MIN
GFR NON-AFRICAN AMERICAN: >60 ML/MIN
GFR SERPL CREATININE-BSD FRML MDRD: ABNORMAL ML/MIN/{1.73_M2}
GFR SERPL CREATININE-BSD FRML MDRD: ABNORMAL ML/MIN/{1.73_M2}
GLUCOSE BLD-MCNC: 100 MG/DL (ref 70–99)
HDLC SERPL-MCNC: 54 MG/DL
LDL CHOLESTEROL: 51 MG/DL (ref 0–130)
POTASSIUM SERPL-SCNC: 4.5 MMOL/L (ref 3.7–5.3)
SODIUM BLD-SCNC: 142 MMOL/L (ref 135–144)
TOTAL CK: 26 U/L (ref 26–192)
TOTAL PROTEIN: 6.8 G/DL (ref 6.4–8.3)
TRIGLYCERIDE, FASTING: 83 MG/DL
VLDLC SERPL CALC-MCNC: NORMAL MG/DL (ref 1–30)

## 2022-02-16 ENCOUNTER — TELEPHONE (OUTPATIENT)
Dept: FAMILY MEDICINE CLINIC | Age: 87
End: 2022-02-16

## 2022-04-21 ENCOUNTER — HOSPITAL ENCOUNTER (OUTPATIENT)
Age: 87
Setting detail: SPECIMEN
Discharge: HOME | End: 2022-04-21

## 2022-04-21 LAB
ALBUMIN SERPL-MCNC: 4.8 G/DL (ref 3.5–5.2)
ALBUMIN/GLOBULIN RATIO: 2.5 (ref 1–2.5)
ALP BLD-CCNC: 78 U/L (ref 35–104)
ALT SERPL-CCNC: 16 U/L (ref 5–33)
ANION GAP SERPL CALCULATED.3IONS-SCNC: 13 MMOL/L (ref 9–17)
AST SERPL-CCNC: 24 U/L
BILIRUB SERPL-MCNC: 0.71 MG/DL (ref 0.3–1.2)
BUN BLDV-MCNC: 13 MG/DL (ref 8–23)
CALCIUM SERPL-MCNC: 9.8 MG/DL (ref 8.6–10.4)
CHLORIDE BLD-SCNC: 103 MMOL/L (ref 98–107)
CHOLESTEROL, FASTING: 141 MG/DL
CHOLESTEROL/HDL RATIO: 2.3
CO2: 28 MMOL/L (ref 20–31)
CREAT SERPL-MCNC: 0.71 MG/DL (ref 0.5–0.9)
GFR AFRICAN AMERICAN: >60 ML/MIN
GFR NON-AFRICAN AMERICAN: >60 ML/MIN
GFR SERPL CREATININE-BSD FRML MDRD: NORMAL ML/MIN/{1.73_M2}
GLUCOSE FASTING: 97 MG/DL (ref 70–99)
HDLC SERPL-MCNC: 61 MG/DL
LDL CHOLESTEROL: 64 MG/DL (ref 0–130)
POTASSIUM SERPL-SCNC: 4.7 MMOL/L (ref 3.7–5.3)
SODIUM BLD-SCNC: 144 MMOL/L (ref 135–144)
TOTAL CK: 34 U/L (ref 26–192)
TOTAL PROTEIN: 6.7 G/DL (ref 6.4–8.3)
TRIGLYCERIDE, FASTING: 79 MG/DL

## 2022-08-22 ENCOUNTER — TELEPHONE (OUTPATIENT)
Dept: PHARMACY | Facility: CLINIC | Age: 87
End: 2022-08-22

## 2022-08-22 NOTE — TELEPHONE ENCOUNTER
[facility] CLINICAL PHARMACY: ADHERENCE REVIEW  Identified care gap per [employer]: fills at [facility]: ACE/ARB and Statin adherence    Last Visit: 9/8/21      Patient not found in Outcomes MTM    [address] Records claims through 8/7/22; YTD PDC =  80%; Potential Fail Date: 9/6/22 ):   LOSARTAN POT TAB 100MG due to refill 7/17/22 for 90 day supply. Per [facility]:   LOSARTAN POT TAB 100MG last picked up on 8/12/22 for 90 day supply. Billed through [employer]     BP Readings from Last 3 Encounters:   09/08/21 (!) 170/90   10/10/19 (!) 140/60   04/16/19 132/74     CrCl cannot be calculated (Unknown ideal weight.). [id number] Records claims through 8/7/22 ; YTD PDC =  95%; Potential Fail Date: 12/27/22 ):   ATORVASTATIN TAB 20MG due to refill 10/28/22 for 90 day supply. Per [facility]:   ATORVASTATIN TAB 20MG last picked up on 8/12/22 for 90 day supply. Billed through [employer]. Lab Results   Component Value Date    CHOL 117 04/16/2021    TRIG 66 04/16/2021    HDL 61 04/21/2022    LDLCHOLESTEROL 64 04/21/2022     ALT   Date Value Ref Range Status   04/21/2022 16 5 - 33 U/L Final     AST   Date Value Ref Range Status   04/21/2022 24 <32 U/L Final     The ASCVD Risk score (Ezio Wesley, et al., 2013) failed to calculate for the following reasons: The 2013 ASCVD risk score is only valid for ages 36 to 78     PLAN    Patient recently picked up Losartan and Atorvastatin for 90 d/s on 8/12/22. Appears adherent at this time and no outreach planned. No future appointments.     [address]   Direct: [phone]  Phone: toll free [phone]       For Pharmacy Admin Tracking Only    CPA in place:  No  Gap Closed?: Yes   Time Spent (min): 15

## 2022-09-27 ENCOUNTER — OFFICE VISIT (OUTPATIENT)
Dept: INTERNAL MEDICINE CLINIC | Age: 87
End: 2022-09-27
Payer: MEDICARE

## 2022-09-27 VITALS
OXYGEN SATURATION: 98 % | TEMPERATURE: 97.2 F | RESPIRATION RATE: 18 BRPM | WEIGHT: 117.4 LBS | HEIGHT: 59 IN | SYSTOLIC BLOOD PRESSURE: 150 MMHG | BODY MASS INDEX: 23.67 KG/M2 | DIASTOLIC BLOOD PRESSURE: 90 MMHG | HEART RATE: 72 BPM

## 2022-09-27 DIAGNOSIS — I10 ESSENTIAL HYPERTENSION: Primary | ICD-10-CM

## 2022-09-27 DIAGNOSIS — Z95.2 S/P AVR: ICD-10-CM

## 2022-09-27 DIAGNOSIS — E78.00 HIGH CHOLESTEROL: ICD-10-CM

## 2022-09-27 PROCEDURE — 1123F ACP DISCUSS/DSCN MKR DOCD: CPT | Performed by: INTERNAL MEDICINE

## 2022-09-27 PROCEDURE — 99214 OFFICE O/P EST MOD 30 MIN: CPT | Performed by: INTERNAL MEDICINE

## 2022-09-27 PROCEDURE — G0008 ADMIN INFLUENZA VIRUS VAC: HCPCS | Performed by: INTERNAL MEDICINE

## 2022-09-27 PROCEDURE — 96160 PT-FOCUSED HLTH RISK ASSMT: CPT | Performed by: INTERNAL MEDICINE

## 2022-09-27 PROCEDURE — 90694 VACC AIIV4 NO PRSRV 0.5ML IM: CPT | Performed by: INTERNAL MEDICINE

## 2022-09-27 SDOH — ECONOMIC STABILITY: FOOD INSECURITY: WITHIN THE PAST 12 MONTHS, THE FOOD YOU BOUGHT JUST DIDN'T LAST AND YOU DIDN'T HAVE MONEY TO GET MORE.: NEVER TRUE

## 2022-09-27 SDOH — ECONOMIC STABILITY: FOOD INSECURITY: WITHIN THE PAST 12 MONTHS, YOU WORRIED THAT YOUR FOOD WOULD RUN OUT BEFORE YOU GOT MONEY TO BUY MORE.: NEVER TRUE

## 2022-09-27 ASSESSMENT — PATIENT HEALTH QUESTIONNAIRE - PHQ9
SUM OF ALL RESPONSES TO PHQ9 QUESTIONS 1 & 2: 0
SUM OF ALL RESPONSES TO PHQ QUESTIONS 1-9: 0
1. LITTLE INTEREST OR PLEASURE IN DOING THINGS: 0
SUM OF ALL RESPONSES TO PHQ QUESTIONS 1-9: 0
2. FEELING DOWN, DEPRESSED OR HOPELESS: 0
SUM OF ALL RESPONSES TO PHQ QUESTIONS 1-9: 0
SUM OF ALL RESPONSES TO PHQ QUESTIONS 1-9: 0

## 2022-09-27 ASSESSMENT — SOCIAL DETERMINANTS OF HEALTH (SDOH): HOW HARD IS IT FOR YOU TO PAY FOR THE VERY BASICS LIKE FOOD, HOUSING, MEDICAL CARE, AND HEATING?: NOT HARD AT ALL

## 2022-09-27 NOTE — PROGRESS NOTES
Jose Alejandro Purdy is a [address] [place] [address] y.o. female who presents for   Chief Complaint   Patient presents with    Hypertension    Health Maintenance     Dep, covid, awv, shingles, tdap    and follow up of chronic medical problems. Patient Active Problem List   Diagnosis    Hypertension    Hyperlipidemia     HPI  Here for follow-up on blood pressure and cholesterol denies any new complaints    Current Outpatient Medications   Medication Sig Dispense Refill    Ascorbic Acid (EFFIE-C PO) Take by mouth      timolol (TIMOPTIC) 0.25 % ophthalmic solution PLACE ONE DROP INTO BOTH EYES IN THE MORNING. losartan (COZAAR) 100 MG tablet TAKE 1 TABLET BY MOUTH EVERY DAY      spironolactone (ALDACTONE) 50 MG tablet Take 50 mg by mouth daily      loratadine (CLARITIN) 10 MG tablet Take 1 tablet by mouth daily 90 tablet 1    latanoprost (XALATAN) 0.005 % ophthalmic solution Place 1 drop into both eyes nightly      vitamin E 400 UNIT capsule Take 400 Units by mouth daily      atorvastatin (LIPITOR) 20 MG tablet Take 20 mg by mouth daily      carvedilol (COREG) 25 MG tablet Take 25 mg by mouth 2 times daily       fluticasone (FLONASE) 50 MCG/ACT nasal spray 2 sprays by Nasal route daily (Patient taking differently: 2 sprays by Nasal route daily as needed) 1 Bottle 0    aspirin 81 MG tablet Take 81 mg by mouth daily. Multiple Vitamin (MULTI-VITAMIN DAILY PO) Take 1 tablet by mouth daily       calcium carbonate-vitamin D 600-200 MG-UNIT TABS Take 1 tablet by mouth 2 times daily        No current facility-administered medications for this visit.        No Known Allergies    Past Medical History:   Diagnosis Date    GERD (gastroesophageal reflux disease)     H/O aortic valve replacement 07/26/2018    Dr. Francine Parra    Hyperlipidemia     Hypertension        Past Surgical History:   Procedure Laterality Date    CARDIAC SURGERY      aortic valve replacement 7/26/18    HYSTERECTOMY (CERVIX STATUS UNKNOWN)      JOINT REPLACEMENT      rt hip TONSILLECTOMY         Family History   Problem Relation Age of Onset    High Blood Pressure Mother     Diabetes Sister     Cancer Sister      ROS  Constitutional:  Negative for fatigue, loss of appetite and unexpected weight change  HEENT            : Negative for neck stiffness and pain, no congestion or sinus pressure  Eyes                : No visual disturbance or pain  Cardiovascular: No chest pain or palpitations or leg swelling  Respiratory      : Negative for cough, shortness of breath or wheezing  Gastrointestinal: Negative for abdominal pain, constipation or diarrhea and bloating No nausea or vomiting  Genitourinary:     No urgency or frequency, no burning or hematuria  Musculoskeletal: No arthralgias, back pain or myalgias  Skin                  : Negative for rash or erythema  Neurological    : Negative for dizziness, weakness, tremors ,light headedness or syncope  Psychiatric       : Negative for dysphoric mood, sleep disturbances, nervous or anxious, or decreased concentration  All other review of systems was negative    Objective  Physical Examination:    Nursing note reviewed    BP (!) 150/90 (Site: Right Upper Arm, Position: Sitting, Cuff Size: Medium Adult)   Pulse 72   Temp 97.2 °F (36.2 °C) (Temporal)   Resp 18   Ht 4' 11.06\" (1.5 m)   Wt 117 lb 6.4 oz (53.3 kg)   SpO2 98%   BMI 23.67 kg/m²   BP Readings from Last 3 Encounters:   09/27/22 (!) 150/90   09/08/21 (!) 170/90   10/10/19 (!) 140/60         Constitutional:  Cleatrice is oriented to place, person and time ,appears well-developed and well-nourished  HEENT:  Atraumatic and normocephalic, external ears normal bilaterally, nose normal no oropharyngeal exudate and is clear and moist  Eyes:  EOCM normal; conjunctivae normal; PERRLA bilaterally  Neck:  Normal range of motion, neck supple, no JVD and no thyromegaly  Cardiovascular:  RRR, normal heart sounds and intact distal pulses  Pulmonary:  effort normal and breath sounds normal bilaterally,no wheezes or rales, no respiratory distress  Abdominal:  Soft, non-tender; normal bowel sounds, no masses  Musculoskeletal:  Normal range of motion and no edema or tenderness bilaterally  No lymphadenopathy  Neurological:  alert, oriented, and normal reflexes bilaterally  Skin: warm and dry  Psychiatric:  normal mood and effect; behavior normal.    Labs:   No results found for: LABA1C  Lab Results   Component Value Date    CHOL 117 04/16/2021     Lab Results   Component Value Date    HDL 61 04/21/2022     No results found for: [address]  Lab Results   Component Value Date    TRIG 66 04/16/2021     No results found for: [place], [place]  Lab Results   Component Value Date    WBC 6.3 09/05/2019    HGB 14.0 09/05/2019    HCT 45.9 09/05/2019    MCV 96.4 09/05/2019     09/05/2019     Lab Results   Component Value Date    INR 2.5 12/29/2018    PROTIME 24.8 (H) 12/29/2018     Lab Results   Component Value Date    GLUCOSE 100 (H) 10/20/2021    CREATININE 0.71 04/21/2022    BUN 13 04/21/2022     04/21/2022    K 4.7 04/21/2022     04/21/2022    CO2 28 04/21/2022     Lab Results   Component Value Date    ALT 16 04/21/2022    AST 24 04/21/2022    ALKPHOS 78 04/21/2022    BILITOT 0.71 04/21/2022     Lab Results   Component Value Date    LABPROT 7.2 02/13/2013    LABALBU 4.8 04/21/2022     No results found for: TSH, CBC  Assessment:  1. Essential hypertension    2. High cholesterol    3. S/P AVR        Plan:  Patient blood pressure is stable on current medications  Last LDL 61 and HDL 61 and continue current treatment and patient following with Dr. Don Swift cardiology and will be seeing him again next month  Patient had a history of aortic valve replacement and doing well  Discussed about immunizations and patient had the flu vaccine done today and will be getting the COVID booster soon  Review in 1 year         1. Darrenerica received counseling on the following healthy behaviors: nutrition and exercise    2. Prior labs and health maintenance reviewed. 3.  Discussed use, benefit, and side effects of prescribed medications. Barriers to medication compliance addressed. All her questions were answered. Pt voiced understanding. Cleatrice will continue current medications, diet and exercise. No orders of the defined types were placed in this encounter. Completed Refills               Requested Prescriptions      No prescriptions requested or ordered in this encounter     4. Patient given educational materials - see patient instructions    5. Was a self-tracking handout given in paper form or via [employer]? NO    Orders Placed This Encounter   Procedures    Influenza, FLUAD, (age 72 y+), IM, Preservative Free, 0.5 mL     Return in about 1 year (around 9/27/2023). Patient voiced understanding and agreed to treatment plan. Electronically signed by Boris Mcnamara MD on 9/27/2022 at 3:01 PM    This note is created with a voice recognition program and while intend to generate a document that accurately reflects the content of the visit, no guarantee can be provided that every mistake has been identified and corrected by editing.

## 2022-12-19 ENCOUNTER — TELEPHONE (OUTPATIENT)
Dept: PHARMACY | Facility: CLINIC | Age: 87
End: 2022-12-19

## 2022-12-19 NOTE — TELEPHONE ENCOUNTER
[facility]OPULATION [facility] CLINICAL PHARMACY: ADHERENCE REVIEW  Identified care gap per [employer]: fills at [facility]: ACE/ARB and Statin adherence    Last Visit: 9/27/22    Patient not found in Outcomes MTM    ASSESSMENT  ACE/ARB ADHERENCE    Losartan reviewed. Not due to fill at this time. Passed 2022    BP Readings from Last 3 Encounters:   09/27/22 (!) 150/90   09/08/21 (!) 170/90   10/10/19 (!) 140/60     CrCl cannot be calculated (Patient's most recent lab result is older than the maximum 180 days allowed. ). [id number] Records claims through 12/4/22 (Prior Year [facility] =  100%; YTD [facility] =  85%; Potential Fail Date: 12/27/22 ):   Atorvastatin 20 mg tablets last filled on 7/30/22 for 90 day supply. Next refill due: 10/28/22    Per [facility]:   Atorvastatin last picked up on 12/8/22 for 90 day supply. 3 refills remaining. Billed through [facility]   Component Value Date    CHOL 117 04/16/2021    TRIG 66 04/16/2021    HDL 61 04/21/2022    LDLCHOLESTEROL 64 04/21/2022     ALT   Date Value Ref Range Status   04/21/2022 16 5 - 33 U/L Final     AST   Date Value Ref Range Status   04/21/2022 24 <32 U/L Final     The ASCVD Risk score (Neto DK, et al., 2019) failed to calculate for the following reasons: The 2019 ASCVD risk score is only valid for ages 36 to 78     PLAN    No patient out reach planned at this time. No future appointments.     Skyler Blum, Winston[place], [facility], [place]   Department, toll free: [phone], option 1    For Pharmacy Admin Tracking Only    Program: [address] in place:  No  Gap Closed?: Yes   Time Spent (min): 5

## 2023-02-28 NOTE — PROGRESS NOTES
A user error has taken place: encounter opened in error, closed for administrative reasons. Steroid injection scheduled 3/17/23

## 2023-05-19 ENCOUNTER — TELEPHONE (OUTPATIENT)
Dept: INTERNAL MEDICINE CLINIC | Age: 88
End: 2023-05-19

## 2023-05-19 NOTE — TELEPHONE ENCOUNTER
Pt states she took her blood pressure with a at home blood pressure cuff and her BP was at 150/70 and her pulse was at 50.  I made her a appointment for the 30th at 1045 AM.

## 2023-05-30 ENCOUNTER — APPOINTMENT (OUTPATIENT)
Dept: GENERAL RADIOLOGY | Age: 88
DRG: 243 | End: 2023-05-30
Payer: MEDICARE

## 2023-05-30 ENCOUNTER — HOSPITAL ENCOUNTER (INPATIENT)
Age: 88
LOS: 3 days | Discharge: HOME | DRG: 243 | End: 2023-06-02
Attending: EMERGENCY MEDICINE | Admitting: INTERNAL MEDICINE
Payer: MEDICARE

## 2023-05-30 ENCOUNTER — OFFICE VISIT (OUTPATIENT)
Dept: INTERNAL MEDICINE CLINIC | Age: 88
End: 2023-05-30

## 2023-05-30 VITALS
OXYGEN SATURATION: 99 % | HEART RATE: 36 BPM | TEMPERATURE: 97.4 F | RESPIRATION RATE: 18 BRPM | WEIGHT: 124.6 LBS | SYSTOLIC BLOOD PRESSURE: 102 MMHG | DIASTOLIC BLOOD PRESSURE: 60 MMHG | BODY MASS INDEX: 25.12 KG/M2 | HEIGHT: 59 IN

## 2023-05-30 DIAGNOSIS — R79.89 ELEVATED BRAIN NATRIURETIC PEPTIDE (BNP) LEVEL: ICD-10-CM

## 2023-05-30 DIAGNOSIS — R53.83 OTHER FATIGUE: ICD-10-CM

## 2023-05-30 DIAGNOSIS — I44.2 COMPLETE HEART BLOCK (HCC): Primary | ICD-10-CM

## 2023-05-30 DIAGNOSIS — I10 ESSENTIAL HYPERTENSION: Primary | ICD-10-CM

## 2023-05-30 DIAGNOSIS — N17.9 AKI (ACUTE KIDNEY INJURY) (HCC): ICD-10-CM

## 2023-05-30 DIAGNOSIS — R00.1 BRADYCARDIA: ICD-10-CM

## 2023-05-30 LAB
ANION GAP SERPL CALCULATED.3IONS-SCNC: 14 MMOL/L (ref 9–17)
BASOPHILS # BLD: 0.03 K/UL (ref 0–0.2)
BASOPHILS NFR BLD: 0 % (ref 0–2)
BNP SERPL-MCNC: ABNORMAL PG/ML
BUN SERPL-MCNC: 26 MG/DL (ref 8–23)
BUN/CREAT SERPL: 15 (ref 9–20)
CALCIUM SERPL-MCNC: 9.2 MG/DL (ref 8.6–10.4)
CHLORIDE SERPL-SCNC: 101 MMOL/L (ref 98–107)
CO2 SERPL-SCNC: 18 MMOL/L (ref 20–31)
CREAT SERPL-MCNC: 1.76 MG/DL (ref 0.5–0.9)
EKG ATRIAL RATE: 28 BPM
EKG P AXIS: 61 DEGREES
EKG P-R INTERVAL: 404 MS
EKG Q-T INTERVAL: 616 MS
EKG QRS DURATION: 122 MS
EKG QTC CALCULATION (BAZETT): 419 MS
EKG R AXIS: 66 DEGREES
EKG T AXIS: 62 DEGREES
EKG VENTRICULAR RATE: 28 BPM
EOSINOPHIL # BLD: <0.03 K/UL (ref 0–0.44)
EOSINOPHILS RELATIVE PERCENT: 0 % (ref 1–4)
ERYTHROCYTE [DISTWIDTH] IN BLOOD BY AUTOMATED COUNT: 13.6 % (ref 11.8–14.4)
GFR SERPL CREATININE-BSD FRML MDRD: 27 ML/MIN/1.73M2
GLUCOSE SERPL-MCNC: 152 MG/DL (ref 70–99)
HCT VFR BLD AUTO: 38.4 % (ref 36.3–47.1)
HGB BLD-MCNC: 12.2 G/DL (ref 11.9–15.1)
IMM GRANULOCYTES # BLD AUTO: 0.06 K/UL (ref 0–0.3)
IMM GRANULOCYTES NFR BLD: 1 %
LYMPHOCYTES # BLD: 8 % (ref 24–43)
LYMPHOCYTES NFR BLD: 0.82 K/UL (ref 1.1–3.7)
MAGNESIUM SERPL-MCNC: 2.4 MG/DL (ref 1.6–2.6)
MCH RBC QN AUTO: 30.7 PG (ref 25.2–33.5)
MCHC RBC AUTO-ENTMCNC: 31.8 G/DL (ref 28.4–34.8)
MCV RBC AUTO: 96.7 FL (ref 82.6–102.9)
MONOCYTES NFR BLD: 0.69 K/UL (ref 0.1–1.2)
MONOCYTES NFR BLD: 6 % (ref 3–12)
NEUTROPHILS NFR BLD: 85 % (ref 36–65)
NEUTS SEG NFR BLD: 9.16 K/UL (ref 1.5–8.1)
NRBC AUTOMATED: 0 PER 100 WBC
PLATELET # BLD AUTO: 207 K/UL (ref 138–453)
PMV BLD AUTO: 9.9 FL (ref 8.1–13.5)
POC IONIZED CALCIUM: 1.25 MMOL/L (ref 1.15–1.33)
POTASSIUM SERPL-SCNC: 5.2 MMOL/L (ref 3.7–5.3)
RBC # BLD AUTO: 3.97 M/UL (ref 3.95–5.11)
SODIUM SERPL-SCNC: 133 MMOL/L (ref 135–144)
TROPONIN I SERPL HS-MCNC: 33 NG/L (ref 0–14)
TROPONIN I SERPL HS-MCNC: 35 NG/L (ref 0–14)
TSH SERPL-MCNC: 4.71 UIU/ML (ref 0.3–5)
WBC OTHER # BLD: 10.8 K/UL (ref 3.5–11.3)

## 2023-05-30 PROCEDURE — 83735 ASSAY OF MAGNESIUM: CPT

## 2023-05-30 PROCEDURE — 99285 EMERGENCY DEPT VISIT HI MDM: CPT

## 2023-05-30 PROCEDURE — 2000000000 HC ICU R&B

## 2023-05-30 PROCEDURE — 85025 COMPLETE CBC W/AUTO DIFF WBC: CPT

## 2023-05-30 PROCEDURE — 83880 ASSAY OF NATRIURETIC PEPTIDE: CPT

## 2023-05-30 PROCEDURE — 2580000003 HC RX 258: Performed by: EMERGENCY MEDICINE

## 2023-05-30 PROCEDURE — 2580000003 HC RX 258: Performed by: NURSE PRACTITIONER

## 2023-05-30 PROCEDURE — 80048 BASIC METABOLIC PNL TOTAL CA: CPT

## 2023-05-30 PROCEDURE — 6360000002 HC RX W HCPCS: Performed by: EMERGENCY MEDICINE

## 2023-05-30 PROCEDURE — 84484 ASSAY OF TROPONIN QUANT: CPT

## 2023-05-30 PROCEDURE — 96375 TX/PRO/DX INJ NEW DRUG ADDON: CPT

## 2023-05-30 PROCEDURE — 99222 1ST HOSP IP/OBS MODERATE 55: CPT | Performed by: NURSE PRACTITIONER

## 2023-05-30 PROCEDURE — 6360000002 HC RX W HCPCS: Performed by: NURSE PRACTITIONER

## 2023-05-30 PROCEDURE — 84443 ASSAY THYROID STIM HORMONE: CPT

## 2023-05-30 PROCEDURE — 71045 X-RAY EXAM CHEST 1 VIEW: CPT

## 2023-05-30 PROCEDURE — 82330 ASSAY OF CALCIUM: CPT

## 2023-05-30 PROCEDURE — 96374 THER/PROPH/DIAG INJ IV PUSH: CPT

## 2023-05-30 PROCEDURE — 6370000000 HC RX 637 (ALT 250 FOR IP): Performed by: NURSE PRACTITIONER

## 2023-05-30 PROCEDURE — 93005 ELECTROCARDIOGRAM TRACING: CPT | Performed by: EMERGENCY MEDICINE

## 2023-05-30 RX ORDER — ENOXAPARIN SODIUM 100 MG/ML
30 INJECTION SUBCUTANEOUS DAILY
Status: DISCONTINUED | OUTPATIENT
Start: 2023-05-30 | End: 2023-06-02 | Stop reason: HOSPADM

## 2023-05-30 RX ORDER — ATORVASTATIN CALCIUM 20 MG/1
20 TABLET, FILM COATED ORAL DAILY
Status: DISCONTINUED | OUTPATIENT
Start: 2023-05-30 | End: 2023-06-02 | Stop reason: HOSPADM

## 2023-05-30 RX ORDER — CETIRIZINE HYDROCHLORIDE 5 MG/1
5 TABLET ORAL DAILY
Status: DISCONTINUED | OUTPATIENT
Start: 2023-05-30 | End: 2023-06-02 | Stop reason: HOSPADM

## 2023-05-30 RX ORDER — ONDANSETRON 2 MG/ML
4 INJECTION INTRAMUSCULAR; INTRAVENOUS EVERY 6 HOURS PRN
Status: DISCONTINUED | OUTPATIENT
Start: 2023-05-30 | End: 2023-06-02 | Stop reason: HOSPADM

## 2023-05-30 RX ORDER — ACETAMINOPHEN 325 MG/1
650 TABLET ORAL EVERY 6 HOURS PRN
Status: DISCONTINUED | OUTPATIENT
Start: 2023-05-30 | End: 2023-06-02 | Stop reason: HOSPADM

## 2023-05-30 RX ORDER — SPIRONOLACTONE 25 MG/1
50 TABLET ORAL DAILY
Status: DISCONTINUED | OUTPATIENT
Start: 2023-05-30 | End: 2023-06-02 | Stop reason: HOSPADM

## 2023-05-30 RX ORDER — SODIUM CHLORIDE 9 MG/ML
INJECTION, SOLUTION INTRAVENOUS PRN
Status: DISCONTINUED | OUTPATIENT
Start: 2023-05-30 | End: 2023-05-31 | Stop reason: SDUPTHER

## 2023-05-30 RX ORDER — ACETAMINOPHEN 650 MG/1
650 SUPPOSITORY RECTAL EVERY 6 HOURS PRN
Status: DISCONTINUED | OUTPATIENT
Start: 2023-05-30 | End: 2023-06-02 | Stop reason: HOSPADM

## 2023-05-30 RX ORDER — ONDANSETRON 2 MG/ML
4 INJECTION INTRAMUSCULAR; INTRAVENOUS ONCE
Status: COMPLETED | OUTPATIENT
Start: 2023-05-30 | End: 2023-05-30

## 2023-05-30 RX ORDER — POLYETHYLENE GLYCOL 3350 17 G/17G
17 POWDER, FOR SOLUTION ORAL DAILY PRN
Status: DISCONTINUED | OUTPATIENT
Start: 2023-05-30 | End: 2023-06-02 | Stop reason: HOSPADM

## 2023-05-30 RX ORDER — ASPIRIN 81 MG/1
81 TABLET, CHEWABLE ORAL DAILY
Status: DISCONTINUED | OUTPATIENT
Start: 2023-05-30 | End: 2023-06-02 | Stop reason: HOSPADM

## 2023-05-30 RX ORDER — MAGNESIUM SULFATE 1 G/100ML
1000 INJECTION INTRAVENOUS PRN
Status: DISCONTINUED | OUTPATIENT
Start: 2023-05-30 | End: 2023-05-31

## 2023-05-30 RX ORDER — ONDANSETRON 4 MG/1
4 TABLET, ORALLY DISINTEGRATING ORAL EVERY 8 HOURS PRN
Status: DISCONTINUED | OUTPATIENT
Start: 2023-05-30 | End: 2023-06-02 | Stop reason: HOSPADM

## 2023-05-30 RX ORDER — POTASSIUM CHLORIDE 20 MEQ/1
40 TABLET, EXTENDED RELEASE ORAL PRN
Status: DISCONTINUED | OUTPATIENT
Start: 2023-05-30 | End: 2023-05-31

## 2023-05-30 RX ORDER — SODIUM CHLORIDE 9 MG/ML
INJECTION, SOLUTION INTRAVENOUS CONTINUOUS
Status: DISCONTINUED | OUTPATIENT
Start: 2023-05-30 | End: 2023-05-31

## 2023-05-30 RX ORDER — LOSARTAN POTASSIUM 100 MG/1
100 TABLET ORAL DAILY
Status: DISCONTINUED | OUTPATIENT
Start: 2023-05-30 | End: 2023-06-02 | Stop reason: HOSPADM

## 2023-05-30 RX ORDER — SODIUM CHLORIDE 0.9 % (FLUSH) 0.9 %
5-40 SYRINGE (ML) INJECTION EVERY 12 HOURS SCHEDULED
Status: DISCONTINUED | OUTPATIENT
Start: 2023-05-30 | End: 2023-06-02 | Stop reason: HOSPADM

## 2023-05-30 RX ORDER — SODIUM CHLORIDE 0.9 % (FLUSH) 0.9 %
10 SYRINGE (ML) INJECTION PRN
Status: DISCONTINUED | OUTPATIENT
Start: 2023-05-30 | End: 2023-06-02 | Stop reason: HOSPADM

## 2023-05-30 RX ORDER — POTASSIUM CHLORIDE 7.45 MG/ML
10 INJECTION INTRAVENOUS PRN
Status: DISCONTINUED | OUTPATIENT
Start: 2023-05-30 | End: 2023-05-31

## 2023-05-30 RX ADMIN — ONDANSETRON 4 MG: 2 INJECTION INTRAMUSCULAR; INTRAVENOUS at 11:56

## 2023-05-30 RX ADMIN — SODIUM CHLORIDE: 9 INJECTION, SOLUTION INTRAVENOUS at 13:00

## 2023-05-30 RX ADMIN — SODIUM CHLORIDE: 9 INJECTION, SOLUTION INTRAVENOUS at 19:33

## 2023-05-30 RX ADMIN — ENOXAPARIN SODIUM 30 MG: 100 INJECTION SUBCUTANEOUS at 18:24

## 2023-05-30 RX ADMIN — ASPIRIN 81 MG CHEWABLE TABLET 81 MG: 81 TABLET CHEWABLE at 18:24

## 2023-05-30 RX ADMIN — ATORVASTATIN CALCIUM 20 MG: 20 TABLET, FILM COATED ORAL at 19:21

## 2023-05-30 RX ADMIN — GLUCAGON 4 MG: KIT at 12:13

## 2023-05-30 RX ADMIN — SODIUM CHLORIDE, PRESERVATIVE FREE 10 ML: 5 INJECTION INTRAVENOUS at 19:22

## 2023-05-30 SDOH — ECONOMIC STABILITY: HOUSING INSECURITY
IN THE LAST 12 MONTHS, WAS THERE A TIME WHEN YOU DID NOT HAVE A STEADY PLACE TO SLEEP OR SLEPT IN A SHELTER (INCLUDING NOW)?: NO

## 2023-05-30 SDOH — ECONOMIC STABILITY: FOOD INSECURITY: WITHIN THE PAST 12 MONTHS, THE FOOD YOU BOUGHT JUST DIDN'T LAST AND YOU DIDN'T HAVE MONEY TO GET MORE.: NEVER TRUE

## 2023-05-30 SDOH — ECONOMIC STABILITY: INCOME INSECURITY: HOW HARD IS IT FOR YOU TO PAY FOR THE VERY BASICS LIKE FOOD, HOUSING, MEDICAL CARE, AND HEATING?: NOT HARD AT ALL

## 2023-05-30 SDOH — ECONOMIC STABILITY: FOOD INSECURITY: WITHIN THE PAST 12 MONTHS, YOU WORRIED THAT YOUR FOOD WOULD RUN OUT BEFORE YOU GOT MONEY TO BUY MORE.: NEVER TRUE

## 2023-05-30 ASSESSMENT — PATIENT HEALTH QUESTIONNAIRE - PHQ9
SUM OF ALL RESPONSES TO PHQ QUESTIONS 1-9: 0
SUM OF ALL RESPONSES TO PHQ QUESTIONS 1-9: 0
SUM OF ALL RESPONSES TO PHQ9 QUESTIONS 1 & 2: 0
SUM OF ALL RESPONSES TO PHQ QUESTIONS 1-9: 0
2. FEELING DOWN, DEPRESSED OR HOPELESS: 0
1. LITTLE INTEREST OR PLEASURE IN DOING THINGS: 0
SUM OF ALL RESPONSES TO PHQ QUESTIONS 1-9: 0

## 2023-05-30 ASSESSMENT — ENCOUNTER SYMPTOMS
VOMITING: 0
BACK PAIN: 0
SINUS PRESSURE: 0
NAUSEA: 0
PHOTOPHOBIA: 0
WHEEZING: 0
SINUS PAIN: 0
DIARRHEA: 0
CONSTIPATION: 0
ABDOMINAL PAIN: 0
SHORTNESS OF BREATH: 0
COUGH: 0

## 2023-05-30 NOTE — ED PROVIDER NOTES
Pro-BNP [id number] (*)     All other components within normal limits   MAGNESIUM   TSH WITH REFLEX   CALCIUM, IONIC (POC)   TROPONIN       RADIOLOGY:  XR CHEST 1 VIEW    Result Date: 5/30/2023  EXAMINATION: ONE XRAY VIEW OF THE CHEST 5/30/2023 11:52 am COMPARISON: None. HISTORY: ORDERING SYSTEM PROVIDED HISTORY: bradycardia TECHNOLOGIST PROVIDED HISTORY: bradycardia Reason for Exam: fatigue, bradycardia hx of HTN FINDINGS: The lungs are without acute focal process. There is no effusion or pneumothorax. The cardiomediastinal silhouette is without acute process. The osseous structures are without acute process. No acute process. EKG    EKG Interpretation    Interpreted by emergency department physician    Rhythm: 3 degree AV block  Rate: 24  Axis: normal  Ectopy: none  Conduction: right bundle branch block (complete) and 3rd degree AV block  ST Segments: no acute change  T Waves: no acute change  Q Waves: none    Clinical Impression: non-specific EKG    All EKG's are interpreted by the Emergency Department Physician whoeither signs or Co-signs this chart in the absence of a cardiologist.    EMERGENCY DEPARTMENT COURSE:  ED Course as of 05/30/23 1336   Tue May 30, 2023   1136 Dr. Mee Mahoney since she is on Bblocker. Admit.  He will see her later today  [AO]   1152 CBC with Auto Differential(!):    WBC 10.8   RBC 3.97   Hemoglobin Quant 12.2   Hematocrit 38.4   MCV 96.7   MCH 30.7   MCHC 31.8   RDW 13.6   Platelet Count 401   MPV 9.9   NRBC Automated 0.0   Seg Neutrophils 85(!)   Lymphocytes 8(!)   Monocytes 6   Eosinophils % 0(!)   Basophils 0   Immature Granulocytes 1(!)   Segs Absolute 9.16(!)   Absolute Lymph # 0.82(!)   Absolute Mono # 0.69   Absolute Eos # <0.03   Basophils Absolute 0.03   Absolute Immature Granulocyte 0.06 [AO]   1207 POC Ionized Calcium: 1.25 [AO]   1208 XR CHEST 1 VIEW [AO]   9511 Basic Metabolic Panel(!):    Glucose, Random 152(!)   BUN,BUNPL 26(!)   Creatinine 1.76(!)   Est,

## 2023-05-30 NOTE — PROGRESS NOTES
Chris Hanson is a 80 y.o. female who presents for   Chief Complaint   Patient presents with    Health Maintenance     Dtdanyelle, shingles, AWV     Follow-up     Follow up appointment   No refills requested     Fatigue     Pt complains of fatigue and loss of appetite     and follow up of chronic medical problems. Patient Active Problem List   Diagnosis    Hypertension    Hyperlipidemia     HPI  Here for follow up from fatigue andLast week check her blood pressure it was 150/70 and her pulse rate was 50 and patient made a follow-up appointment and has no complaints other than some fatigue    Current Outpatient Medications   Medication Sig Dispense Refill    Ascorbic Acid (EFFIE-C PO) Take by mouth      timolol (TIMOPTIC) 0.25 % ophthalmic solution PLACE ONE DROP INTO BOTH EYES IN THE MORNING. losartan (COZAAR) 100 MG tablet TAKE 1 TABLET BY MOUTH EVERY DAY      spironolactone (ALDACTONE) 50 MG tablet Take 1 tablet by mouth daily      loratadine (CLARITIN) 10 MG tablet Take 1 tablet by mouth daily 90 tablet 1    latanoprost (XALATAN) 0.005 % ophthalmic solution Place 1 drop into both eyes nightly      vitamin E 400 UNIT capsule Take 1 capsule by mouth daily      carvedilol (COREG) 25 MG tablet Take 0.5 tablets by mouth 2 times daily 1/2 tab Bid      fluticasone (FLONASE) 50 MCG/ACT nasal spray 2 sprays by Nasal route daily (Patient taking differently: 2 sprays by Nasal route daily as needed) 1 Bottle 0    aspirin 81 MG tablet Take 1 tablet by mouth daily      Multiple Vitamin (MULTI-VITAMIN DAILY PO) Take 1 tablet by mouth daily       calcium carbonate-vitamin D 600-200 MG-UNIT TABS Take 1 tablet by mouth 2 times daily       atorvastatin (LIPITOR) 20 MG tablet Take 20 mg by mouth daily       No current facility-administered medications for this visit.        No Known Allergies    Past Medical History:   Diagnosis Date    GERD (gastroesophageal reflux disease)     H/O aortic valve replacement 07/26/2018    Dr. Murtaza Newman

## 2023-05-30 NOTE — H&P
pressure. Patient self-reported to the emergency department as she refused EMS transport. ER evaluation confirms complete heart block. Cardiology has been consulted and they advised to administer glucagon due to her long-term beta-blocker use. This was unsuccessful at resolving her bradycardia. At the time my exam patient is resting comfortably without complaint. She advises that she feels this is significant overkill. Severity of the situation is explained to the patient and she is quite thankful to the medical team.    Past Medical History:     Past Medical History:   Diagnosis Date    GERD (gastroesophageal reflux disease)     H/O aortic valve replacement 07/26/2018    Dr. Joey Velazco    Hyperlipidemia     Hypertension         Past Surgical History:     Past Surgical History:   Procedure Laterality Date    CARDIAC SURGERY      aortic valve replacement 7/26/18    HYSTERECTOMY (CERVIX STATUS UNKNOWN)      JOINT REPLACEMENT      rt hip    TONSILLECTOMY          Medications Prior to Admission:     Prior to Admission medications    Medication Sig Start Date End Date Taking?  Authorizing Provider   Ascorbic Acid (EFFIE-C PO) Take by mouth    Historical Provider, MD   timolol (TIMOPTIC) 0.25 % ophthalmic solution PLACE ONE DROP INTO BOTH EYES IN THE MORNING. 7/26/21   Historical Provider, MD   losartan (COZAAR) 100 MG tablet TAKE 1 TABLET BY MOUTH EVERY DAY 7/26/21   Historical Provider, MD   spironolactone (ALDACTONE) 50 MG tablet Take 1 tablet by mouth daily    Historical Provider, MD   loratadine (CLARITIN) 10 MG tablet Take 1 tablet by mouth daily 1/25/19   Lázaro Sy MD   latanoprost (XALATAN) 0.005 % ophthalmic solution Place 1 drop into both eyes nightly    Historical Provider, MD   vitamin E 400 UNIT capsule Take 1 capsule by mouth daily    Historical Provider, MD   atorvastatin (LIPITOR) 20 MG tablet Take 20 mg by mouth daily 7/31/18 9/27/22  Historical Provider, MD   carvedilol (COREG) 25 MG tablet

## 2023-05-30 NOTE — ED NOTES
ED to inpatient nurses report     Chief Complaint   Patient presents with    Fatigue     Sent by PCP, states no energy, poor appetite      Present to ED from PCP office for bradycardia and fatigue, Pt hr is 27 upon arrival  LOC: alert and orientated to name, place, date  Vital signs   Vitals:    05/30/23 1123 05/30/23 1143   Pulse: (!) 29 (!) 26   Resp: 16    Temp: 97 °F (36.1 °C)    TempSrc: Oral    SpO2: 100%       Oxygen Baseline room air    Current needs required monitoring HR     LDAs:   Peripheral IV 05/30/23 Right Antecubital (Active)     Mobility: Independent  Fall Risk:  yes due to HR   Pending ED orders: none  Present condition: fair  Code Status: full  Consults: IP CONSULT TO CARDIOLOGY  IP CONSULT TO HOSPITALIST  []  Hospitalist  Completed  [] yes [] no Who:   []  Medicine  Completed  [] yes [] No Who:   []  Cardiology  Completed  [] yes [] No Who:   []  GI   Completed  [] yes [] No Who:   []  Neurology  Completed  [] yes [] No Who:   []  Nephrology Completed  [] yes [] No Who:    []  Vascular  Completed  [] yes [] No Who:   []  Ortho  Completed  [] yes [] No Who:     []  Surgery  Completed  [] yes [] No Who:    []  Urology  Completed  [] yes [] No Who:    []  CT Surgery Completed  [] yes [] No Who:   []  Podiatry  Completed  [] yes [] No Who:    []  Other    Completed  [] yes [] No Who:  Interventions: labs, CXR, EKG, meds   Important Events: pt seen pcp today, she is in complete heart block        Electronically signed by Marissa Dudley RN on 5/30/2023 at 1:17 PM       Baltazar Ramirez RN  05/30/23 7358

## 2023-05-31 ENCOUNTER — APPOINTMENT (OUTPATIENT)
Dept: GENERAL RADIOLOGY | Age: 88
DRG: 243 | End: 2023-05-31
Payer: MEDICARE

## 2023-05-31 ENCOUNTER — APPOINTMENT (OUTPATIENT)
Dept: CARDIAC CATH/INVASIVE PROCEDURES | Age: 88
DRG: 243 | End: 2023-05-31
Payer: MEDICARE

## 2023-05-31 PROBLEM — N17.9 AKI (ACUTE KIDNEY INJURY) (HCC): Status: ACTIVE | Noted: 2023-05-31

## 2023-05-31 LAB
ANION GAP SERPL CALCULATED.3IONS-SCNC: 10 MMOL/L (ref 9–17)
BASOPHILS # BLD: <0.03 K/UL (ref 0–0.2)
BASOPHILS NFR BLD: 0 % (ref 0–2)
BUN SERPL-MCNC: 30 MG/DL (ref 8–23)
BUN/CREAT SERPL: 16 (ref 9–20)
CALCIUM SERPL-MCNC: 8.8 MG/DL (ref 8.6–10.4)
CHLORIDE SERPL-SCNC: 105 MMOL/L (ref 98–107)
CO2 SERPL-SCNC: 19 MMOL/L (ref 20–31)
CREAT SERPL-MCNC: 1.92 MG/DL (ref 0.5–0.9)
EOSINOPHIL # BLD: 0.06 K/UL (ref 0–0.44)
EOSINOPHILS RELATIVE PERCENT: 1 % (ref 1–4)
ERYTHROCYTE [DISTWIDTH] IN BLOOD BY AUTOMATED COUNT: 13.9 % (ref 11.8–14.4)
GFR SERPL CREATININE-BSD FRML MDRD: 24 ML/MIN/1.73M2
GLUCOSE SERPL-MCNC: 98 MG/DL (ref 70–99)
HCT VFR BLD AUTO: 31.7 % (ref 36.3–47.1)
HGB BLD-MCNC: 10.4 G/DL (ref 11.9–15.1)
IMM GRANULOCYTES # BLD AUTO: 0.02 K/UL (ref 0–0.3)
IMM GRANULOCYTES NFR BLD: 0 %
INR PPP: 1.4
LV EF: 75 %
LVEF MODALITY: NORMAL
LYMPHOCYTES # BLD: 14 % (ref 24–43)
LYMPHOCYTES NFR BLD: 0.96 K/UL (ref 1.1–3.7)
MCH RBC QN AUTO: 30.6 PG (ref 25.2–33.5)
MCHC RBC AUTO-ENTMCNC: 32.8 G/DL (ref 28.4–34.8)
MCV RBC AUTO: 93.2 FL (ref 82.6–102.9)
MONOCYTES NFR BLD: 0.71 K/UL (ref 0.1–1.2)
MONOCYTES NFR BLD: 10 % (ref 3–12)
NEUTROPHILS NFR BLD: 75 % (ref 36–65)
NEUTS SEG NFR BLD: 5.27 K/UL (ref 1.5–8.1)
NRBC AUTOMATED: 0 PER 100 WBC
PLATELET # BLD AUTO: 166 K/UL (ref 138–453)
PMV BLD AUTO: 9.9 FL (ref 8.1–13.5)
POTASSIUM SERPL-SCNC: 5.3 MMOL/L (ref 3.7–5.3)
PROTHROMBIN TIME: 16.8 SEC (ref 11.5–14.2)
RBC # BLD AUTO: 3.4 M/UL (ref 3.95–5.11)
SODIUM SERPL-SCNC: 134 MMOL/L (ref 135–144)
WBC OTHER # BLD: 7 K/UL (ref 3.5–11.3)

## 2023-05-31 PROCEDURE — 71045 X-RAY EXAM CHEST 1 VIEW: CPT

## 2023-05-31 PROCEDURE — C1898 LEAD, PMKR, OTHER THAN TRANS: HCPCS

## 2023-05-31 PROCEDURE — 2580000003 HC RX 258: Performed by: INTERNAL MEDICINE

## 2023-05-31 PROCEDURE — 02H63JZ INSERTION OF PACEMAKER LEAD INTO RIGHT ATRIUM, PERCUTANEOUS APPROACH: ICD-10-PCS | Performed by: INTERNAL MEDICINE

## 2023-05-31 PROCEDURE — 85610 PROTHROMBIN TIME: CPT

## 2023-05-31 PROCEDURE — 99153 MOD SED SAME PHYS/QHP EA: CPT

## 2023-05-31 PROCEDURE — 85025 COMPLETE CBC W/AUTO DIFF WBC: CPT

## 2023-05-31 PROCEDURE — 36415 COLL VENOUS BLD VENIPUNCTURE: CPT

## 2023-05-31 PROCEDURE — 02HK3JZ INSERTION OF PACEMAKER LEAD INTO RIGHT VENTRICLE, PERCUTANEOUS APPROACH: ICD-10-PCS | Performed by: INTERNAL MEDICINE

## 2023-05-31 PROCEDURE — 99232 SBSQ HOSP IP/OBS MODERATE 35: CPT | Performed by: INTERNAL MEDICINE

## 2023-05-31 PROCEDURE — 6360000002 HC RX W HCPCS: Performed by: INTERNAL MEDICINE

## 2023-05-31 PROCEDURE — 2500000003 HC RX 250 WO HCPCS

## 2023-05-31 PROCEDURE — 6360000004 HC RX CONTRAST MEDICATION

## 2023-05-31 PROCEDURE — 80048 BASIC METABOLIC PNL TOTAL CA: CPT

## 2023-05-31 PROCEDURE — 6370000000 HC RX 637 (ALT 250 FOR IP): Performed by: NURSE PRACTITIONER

## 2023-05-31 PROCEDURE — 99152 MOD SED SAME PHYS/QHP 5/>YRS: CPT

## 2023-05-31 PROCEDURE — 93306 TTE W/DOPPLER COMPLETE: CPT

## 2023-05-31 PROCEDURE — C1785 PMKR, DUAL, RATE-RESP: HCPCS

## 2023-05-31 PROCEDURE — 94761 N-INVAS EAR/PLS OXIMETRY MLT: CPT

## 2023-05-31 PROCEDURE — 2060000000 HC ICU INTERMEDIATE R&B

## 2023-05-31 PROCEDURE — 0JH606Z INSERTION OF PACEMAKER, DUAL CHAMBER INTO CHEST SUBCUTANEOUS TISSUE AND FASCIA, OPEN APPROACH: ICD-10-PCS | Performed by: INTERNAL MEDICINE

## 2023-05-31 PROCEDURE — 6370000000 HC RX 637 (ALT 250 FOR IP): Performed by: INTERNAL MEDICINE

## 2023-05-31 PROCEDURE — 33208 INSRT HEART PM ATRIAL & VENT: CPT

## 2023-05-31 PROCEDURE — 6360000002 HC RX W HCPCS

## 2023-05-31 RX ORDER — SODIUM CHLORIDE 0.9 % (FLUSH) 0.9 %
5-40 SYRINGE (ML) INJECTION PRN
Status: DISCONTINUED | OUTPATIENT
Start: 2023-05-31 | End: 2023-06-02 | Stop reason: HOSPADM

## 2023-05-31 RX ORDER — DEXTROSE AND SODIUM CHLORIDE 5; .45 G/100ML; G/100ML
INJECTION, SOLUTION INTRAVENOUS CONTINUOUS
Status: DISCONTINUED | OUTPATIENT
Start: 2023-05-31 | End: 2023-06-01

## 2023-05-31 RX ORDER — SODIUM CHLORIDE 9 MG/ML
INJECTION, SOLUTION INTRAVENOUS PRN
Status: DISCONTINUED | OUTPATIENT
Start: 2023-05-31 | End: 2023-06-02 | Stop reason: HOSPADM

## 2023-05-31 RX ORDER — CARVEDILOL 12.5 MG/1
12.5 TABLET ORAL 2 TIMES DAILY WITH MEALS
Status: DISCONTINUED | OUTPATIENT
Start: 2023-05-31 | End: 2023-06-02 | Stop reason: HOSPADM

## 2023-05-31 RX ORDER — SODIUM CHLORIDE 0.9 % (FLUSH) 0.9 %
5-40 SYRINGE (ML) INJECTION EVERY 12 HOURS SCHEDULED
Status: DISCONTINUED | OUTPATIENT
Start: 2023-05-31 | End: 2023-06-02 | Stop reason: HOSPADM

## 2023-05-31 RX ADMIN — CARVEDILOL 12.5 MG: 12.5 TABLET, FILM COATED ORAL at 16:34

## 2023-05-31 RX ADMIN — CETIRIZINE HYDROCHLORIDE 5 MG: 5 TABLET ORAL at 08:26

## 2023-05-31 RX ADMIN — DEXTROSE AND SODIUM CHLORIDE: 5; 450 INJECTION, SOLUTION INTRAVENOUS at 08:09

## 2023-05-31 RX ADMIN — VANCOMYCIN HYDROCHLORIDE 1000 MG: 1 INJECTION, POWDER, LYOPHILIZED, FOR SOLUTION INTRAVENOUS at 15:08

## 2023-05-31 RX ADMIN — ATORVASTATIN CALCIUM 20 MG: 20 TABLET, FILM COATED ORAL at 19:46

## 2023-05-31 NOTE — CARE COORDINATION
Discharge planning    Plan is home with daughter and Deborah Anders. Daughter's address:  [address] [address] [id number]  1 story home with 3 steps to enter. Phone  [phone]    Plan to d/c tomorrow or Friday.

## 2023-05-31 NOTE — CARE COORDINATION
Case Management Assessment  Initial Evaluation    Date/Time of Evaluation: 5/31/2023 5:19 PM  Assessment Completed by: Keira Spann RN    If patient is discharged prior to next notation, then this note serves as note for discharge by case management. Patient Name: Billy Trejo                   YOB: 1932  Diagnosis: Complete heart block ([facility] [address] [I44.2]  ARNIE (acute kidney injury) ([facility] [address] [N17.9]  Elevated brain natriuretic peptide (BNP) level [R79.89]                   Date / Time: 5/30/2023 11:25 AM    Patient Admission Status: Inpatient   Readmission Risk (Low < 19, Mod (19-27), High > 27): Readmission Risk Score: 13.3    Current PCP: Vidal Santa MD  PCP verified by CM? Yes    Chart Reviewed: Yes      History Provided by: Patient  Patient Orientation: Alert and Oriented, Person, Place, Situation, Self    Patient Cognition: Alert    Hospitalization in the last 30 days (Readmission):  No    If yes, Readmission Assessment in  Navigator will be completed. Advance Directives:      Code Status: Full Code   Patient's Primary Decision Maker is: Legal Next of Kin      Discharge Planning:    Patient lives with: Alone Type of Home: House  Primary Care Giver: Self  Patient Support Systems include: Children   Current Financial resources: None  Current community resources: None  Current services prior to admission: None            Current DME:              Type of Home Care services:  None    ADLS  Prior functional level: Independent in ADLs/IADLs  Current functional level: Independent in ADLs/IADLs    PT AM-PAC:   /24  OT AM-PAC:   /24    Family can provide assistance at DC: Yes  Would you like Case Management to discuss the discharge plan with any other family members/significant others, and if so, who?  No  Plans to Return to Present Housing: Yes  Other Identified Issues/Barriers to RETURNING to current housing: none  Potential Assistance needed at discharge: N/A            Potential DME:

## 2023-05-31 NOTE — PROCEDURES
Procedure: Dual chamber pacer placement. Indication:  Complete heart block      Method: After informed consent was obtained, patient was brought to EP lab and attached to electrocardiographic and hemodynamic monitoring. Conscious sedation was performed with Versed and Fentanyl. Left deltopectoral area was prepped and draped in the normal fashion. Access was obtained in the left subclavian vein and a guidewire was retained in place. Following that a transverse incision was Made and the guidewire was tunneled into incision. Using a standar sheath, an additional guidewire was retained in place. Using one retained guidewire and a standard sheath, a ventricular lead was sutured in place after after suitable pacing and sensing thresholds were obtained. Using the second retained guidewire, an atrial lead was advance to the right atrium and sutured in place after suitable pacing and sensing thresholds were obtained. Wound was irrigated with antibiotic solution, the leads were attached to the generator and the wound was closed with 2 layers of 2.0 Vicryl and one layer of 4.0 Vicryl. Steri-strip was placed over the incision and a pressure dressing was applied over the wound. Medications: Versed 4 mg, Fentanyl 75 mcg, Vancomycin 1 gm. Technical data:   Generator:New device -- [employer], Model #: Assurity MRI [id number], Serial [id number]  Right Atrial lead: [employer], Model #: Tendril STS 2088 TC/46 cm,  Serial #: [id number], Impedance - 380 ohms, p-wave 1.4 mV, Threshold - 1.0 V @0.5 mS  Right ventricular lead: [employer] - Model #: Tendril STS 2088 TC/52 cm, Serial #: [id number], Impedance - 740 ohms, R-wave None Threshold - 0.75 V @ 0.5 mS     Complications: None    Blood loss: Less than 20 mL. Impression:   Successful implantation of dual chamber pacemaker. Plan:   Follow up chest X-ray. Wound check, Device check tomorrow.     Electronically signed by Matt Hinkle MD on 5/31/2023 at 2:55 PM

## 2023-06-01 ENCOUNTER — APPOINTMENT (OUTPATIENT)
Dept: GENERAL RADIOLOGY | Age: 88
DRG: 243 | End: 2023-06-01
Payer: MEDICARE

## 2023-06-01 LAB
ANION GAP SERPL CALCULATED.3IONS-SCNC: 6 MMOL/L (ref 9–17)
BUN SERPL-MCNC: 23 MG/DL (ref 8–23)
BUN/CREAT SERPL: 16 (ref 9–20)
CALCIUM SERPL-MCNC: 8.8 MG/DL (ref 8.6–10.4)
CHLORIDE SERPL-SCNC: 109 MMOL/L (ref 98–107)
CO2 SERPL-SCNC: 22 MMOL/L (ref 20–31)
CREAT SERPL-MCNC: 1.47 MG/DL (ref 0.5–0.9)
GFR SERPL CREATININE-BSD FRML MDRD: 33 ML/MIN/1.73M2
GLUCOSE SERPL-MCNC: 113 MG/DL (ref 70–99)
POTASSIUM SERPL-SCNC: 4.9 MMOL/L (ref 3.7–5.3)
SODIUM SERPL-SCNC: 137 MMOL/L (ref 135–144)

## 2023-06-01 PROCEDURE — 6370000000 HC RX 637 (ALT 250 FOR IP): Performed by: INTERNAL MEDICINE

## 2023-06-01 PROCEDURE — 2580000003 HC RX 258: Performed by: INTERNAL MEDICINE

## 2023-06-01 PROCEDURE — 97166 OT EVAL MOD COMPLEX 45 MIN: CPT

## 2023-06-01 PROCEDURE — 97535 SELF CARE MNGMENT TRAINING: CPT

## 2023-06-01 PROCEDURE — 97530 THERAPEUTIC ACTIVITIES: CPT

## 2023-06-01 PROCEDURE — 97162 PT EVAL MOD COMPLEX 30 MIN: CPT

## 2023-06-01 PROCEDURE — 36415 COLL VENOUS BLD VENIPUNCTURE: CPT

## 2023-06-01 PROCEDURE — 6360000002 HC RX W HCPCS: Performed by: NURSE PRACTITIONER

## 2023-06-01 PROCEDURE — 2060000000 HC ICU INTERMEDIATE R&B

## 2023-06-01 PROCEDURE — 99232 SBSQ HOSP IP/OBS MODERATE 35: CPT | Performed by: NURSE PRACTITIONER

## 2023-06-01 PROCEDURE — 6370000000 HC RX 637 (ALT 250 FOR IP): Performed by: NURSE PRACTITIONER

## 2023-06-01 PROCEDURE — 2580000003 HC RX 258: Performed by: NURSE PRACTITIONER

## 2023-06-01 PROCEDURE — 80048 BASIC METABOLIC PNL TOTAL CA: CPT

## 2023-06-01 PROCEDURE — 71046 X-RAY EXAM CHEST 2 VIEWS: CPT

## 2023-06-01 RX ORDER — SODIUM CHLORIDE 9 MG/ML
INJECTION, SOLUTION INTRAVENOUS CONTINUOUS
Status: DISCONTINUED | OUTPATIENT
Start: 2023-06-01 | End: 2023-06-02

## 2023-06-01 RX ORDER — FLUTICASONE PROPIONATE 50 MCG
2 SPRAY, SUSPENSION (ML) NASAL DAILY
Status: DISCONTINUED | OUTPATIENT
Start: 2023-06-01 | End: 2023-06-02 | Stop reason: HOSPADM

## 2023-06-01 RX ADMIN — CARVEDILOL 12.5 MG: 12.5 TABLET, FILM COATED ORAL at 17:33

## 2023-06-01 RX ADMIN — ASPIRIN 81 MG CHEWABLE TABLET 81 MG: 81 TABLET CHEWABLE at 09:23

## 2023-06-01 RX ADMIN — SODIUM CHLORIDE, PRESERVATIVE FREE 10 ML: 5 INJECTION INTRAVENOUS at 20:24

## 2023-06-01 RX ADMIN — SODIUM CHLORIDE: 9 INJECTION, SOLUTION INTRAVENOUS at 09:22

## 2023-06-01 RX ADMIN — FLUTICASONE PROPIONATE 2 SPRAY: 50 SPRAY, METERED NASAL at 17:33

## 2023-06-01 RX ADMIN — SODIUM CHLORIDE, PRESERVATIVE FREE 10 ML: 5 INJECTION INTRAVENOUS at 09:29

## 2023-06-01 RX ADMIN — CETIRIZINE HYDROCHLORIDE 5 MG: 5 TABLET ORAL at 09:23

## 2023-06-01 RX ADMIN — ATORVASTATIN CALCIUM 20 MG: 20 TABLET, FILM COATED ORAL at 20:24

## 2023-06-01 RX ADMIN — ENOXAPARIN SODIUM 30 MG: 100 INJECTION SUBCUTANEOUS at 09:23

## 2023-06-01 RX ADMIN — CARVEDILOL 12.5 MG: 12.5 TABLET, FILM COATED ORAL at 09:23

## 2023-06-01 RX ADMIN — SODIUM CHLORIDE: 9 INJECTION, SOLUTION INTRAVENOUS at 20:35

## 2023-06-01 ASSESSMENT — PAIN SCALES - GENERAL
PAINLEVEL_OUTOF10: 0

## 2023-06-01 NOTE — DISCHARGE INSTR - COC
Continuity of Care Form    Patient Name: Sofy Tadeo   :  3/4/1932  MRN:  [id number]    Admit date:  2023  Discharge date:  2023    Code Status Order: Full Code   Advance Directives:     Admitting Physician:  Mirna Figueroa DO  PCP: Marly Selby MD    Discharging Nurse: [facility] Unit/Room#:   Discharging Unit Phone Number: [phone]    Emergency Contact:   Extended Emergency Contact Information  Primary Emergency Contact: Case Jones [address] Phone: [phone]  Relation: Step Child  Secondary Emergency Contact: mirta magallon  [place] Phone: [phone]  Relation: Niece/Nephew  Preferred language: English   needed?  No    Past Surgical History:  Past Surgical History:   Procedure Laterality Date    CARDIAC SURGERY      aortic valve replacement 18    HYSTERECTOMY (CERVIX STATUS UNKNOWN)      JOINT REPLACEMENT      rt hip    TONSILLECTOMY         Immunization History:   Immunization History   Administered Date(s) Administered    COVID-19, PFIZER Bivalent, DO NOT Dilute, (age 12y+), IM, 27 mcg/0.3 mL 2022    Fluvirin 4 years and over 10/01/2015    Influenza 10/09/2013    Influenza Virus Vaccine 10/15/2014, 10/10/2018, 09/15/2019    Influenza, AFLURIA (age 1 yrs+), FLUZONE, (age 10 mo+), MDV, 0.5mL 10/05/2016, 10/05/2017    Influenza, FLUAD, (age 72 y+), Adjuvanted, 0.5mL 2021, 2022    Pneumococcal, PCV-13, PREVNAR 15, (age 6w+), IM, 0.5mL 2016    Pneumococcal, PPSV23, PNEUMOVAX 23, (age 2y+), SC/IM, 0.5mL 2017       Active Problems:  Patient Active Problem List   Diagnosis Code    Primary hypertension I10    Hyperlipidemia E78.5    Complete heart block (HCC) I44.2    ARNIE (acute kidney injury) ([facility] [address] N17.9       Isolation/Infection:   Isolation            No Isolation          Patient Infection Status       None to display            Nurse Assessment:  Last Vital Signs: BP (!) 151/54   Pulse 82
independent

## 2023-06-01 NOTE — PLAN OF CARE
Problem: Discharge Planning  Goal: Discharge to home or other facility with appropriate resources  6/1/2023 1126 by Rory Dhaliwal RN  Outcome: Progressing  Flowsheets (Taken 6/1/2023 0800)  Discharge to home or other facility with appropriate resources:   Identify barriers to discharge with patient and caregiver   Arrange for needed discharge resources and transportation as appropriate   Identify discharge learning needs (meds, wound care, etc)   Refer to discharge planning if patient needs post-hospital services based on physician order or complex needs related to functional status, cognitive ability or social support system  6/1/2023 0218 by Niya Cantrell RN  Outcome: Progressing     Problem: ABCDS Injury Assessment  Goal: Absence of physical injury  6/1/2023 1126 by Rory Dhaliwal RN  Outcome: Progressing  Flowsheets (Taken 6/1/2023 0800)  Absence of Physical Injury: Implement safety measures based on patient assessment  6/1/2023 0218 by Niya Cantrell RN  Outcome: Progressing     Problem: Safety - Adult  Goal: Free from fall injury  6/1/2023 1126 by Rory Dhaliwal RN  Outcome: Progressing  Flowsheets (Taken 6/1/2023 0800)  Free From Fall Injury:   Instruct family/caregiver on patient safety   Based on caregiver fall risk screen, instruct family/caregiver to ask for assistance with transferring infant if caregiver noted to have fall risk factors  6/1/2023 0218 by Niya Cantrell RN  Outcome: Progressing     Problem: Pain  Goal: Verbalizes/displays adequate comfort level or baseline comfort level  Outcome: Progressing  Flowsheets (Taken 6/1/2023 0800)  Verbalizes/displays adequate comfort level or baseline comfort level:   Encourage patient to monitor pain and request assistance   Assess pain using appropriate pain scale   Administer analgesics based on type and severity of pain and evaluate response   Implement non-pharmacological measures as appropriate and evaluate response   Consider cultural and social

## 2023-06-01 NOTE — FLOWSHEET NOTE
06/01/23 1629   Treatment Team Notification   Reason for Communication Review case   Team Member Name Mabeljorge Latham NP   Treatment Team Role Advanced Practice Nurse   Method of Communication Secure Message   Response Waiting for response   Notification Time [phone] [id number]: RN contact Yoni Su NP, Internal Medicine, via secure message in regard to the discharge order. Awaiting response at this time. 1631: Kishor replied stating that he put the order in with intent to discharge the patient tomorrow. No further orders at this time.

## 2023-06-02 VITALS
WEIGHT: 137.6 LBS | DIASTOLIC BLOOD PRESSURE: 88 MMHG | BODY MASS INDEX: 27.74 KG/M2 | HEIGHT: 59 IN | RESPIRATION RATE: 15 BRPM | HEART RATE: 79 BPM | OXYGEN SATURATION: 99 % | TEMPERATURE: 98.7 F | SYSTOLIC BLOOD PRESSURE: 157 MMHG

## 2023-06-02 LAB
ANION GAP SERPL CALCULATED.3IONS-SCNC: 7 MMOL/L (ref 9–17)
BUN SERPL-MCNC: 14 MG/DL (ref 8–23)
BUN/CREAT SERPL: 15 (ref 9–20)
CALCIUM SERPL-MCNC: 8.5 MG/DL (ref 8.6–10.4)
CHLORIDE SERPL-SCNC: 112 MMOL/L (ref 98–107)
CO2 SERPL-SCNC: 22 MMOL/L (ref 20–31)
CREAT SERPL-MCNC: 0.91 MG/DL (ref 0.5–0.9)
GFR SERPL CREATININE-BSD FRML MDRD: 60 ML/MIN/1.73M2
GLUCOSE SERPL-MCNC: 92 MG/DL (ref 70–99)
POTASSIUM SERPL-SCNC: 4.2 MMOL/L (ref 3.7–5.3)
SODIUM SERPL-SCNC: 141 MMOL/L (ref 135–144)

## 2023-06-02 PROCEDURE — 6360000002 HC RX W HCPCS: Performed by: NURSE PRACTITIONER

## 2023-06-02 PROCEDURE — 2580000003 HC RX 258: Performed by: NURSE PRACTITIONER

## 2023-06-02 PROCEDURE — 2580000003 HC RX 258: Performed by: INTERNAL MEDICINE

## 2023-06-02 PROCEDURE — 97530 THERAPEUTIC ACTIVITIES: CPT

## 2023-06-02 PROCEDURE — 80048 BASIC METABOLIC PNL TOTAL CA: CPT

## 2023-06-02 PROCEDURE — 97535 SELF CARE MNGMENT TRAINING: CPT

## 2023-06-02 PROCEDURE — 6370000000 HC RX 637 (ALT 250 FOR IP): Performed by: NURSE PRACTITIONER

## 2023-06-02 PROCEDURE — 36415 COLL VENOUS BLD VENIPUNCTURE: CPT

## 2023-06-02 PROCEDURE — 99239 HOSP IP/OBS DSCHRG MGMT >30: CPT | Performed by: NURSE PRACTITIONER

## 2023-06-02 PROCEDURE — 6370000000 HC RX 637 (ALT 250 FOR IP): Performed by: INTERNAL MEDICINE

## 2023-06-02 RX ADMIN — CARVEDILOL 12.5 MG: 12.5 TABLET, FILM COATED ORAL at 08:50

## 2023-06-02 RX ADMIN — ENOXAPARIN SODIUM 30 MG: 100 INJECTION SUBCUTANEOUS at 08:50

## 2023-06-02 RX ADMIN — SODIUM CHLORIDE, PRESERVATIVE FREE 10 ML: 5 INJECTION INTRAVENOUS at 08:51

## 2023-06-02 RX ADMIN — ASPIRIN 81 MG CHEWABLE TABLET 81 MG: 81 TABLET CHEWABLE at 08:50

## 2023-06-02 RX ADMIN — CETIRIZINE HYDROCHLORIDE 5 MG: 5 TABLET ORAL at 08:50

## 2023-06-02 RX ADMIN — SODIUM CHLORIDE: 9 INJECTION, SOLUTION INTRAVENOUS at 05:13

## 2023-06-02 NOTE — DISCHARGE INSTR - DIET

## 2023-06-02 NOTE — PROGRESS NOTES
4235: RN spoke with patient's niece, Asaf Duque, wanting an overnight update on the patient. Pt's niece stated her and the patient's concerns about potential discharge today. Asaf Duque stated, Susan Genao her being 80years old, we [the family] feel that it is very important for her to be strong enough to go home safely so that she does not end up needing to come back to the hospital.\" RN acknowledged her concerns and informed the niece that RN will communicate these concerns to the MD when they round today. The niece then asked that the doctor call her so that she is able to voice her concerns with MD directly. Patient's niece stated understanding at this time.     The niece gave RN her contact information:  Hali Doherty  [phone]
Cath Lab came for patient at 1330 for pacemaker
Discharge teaching and instructions completed with patient using teachback method. AVS reviewed. Patient voiced understanding regarding prescriptions, follow up appointments, and care of self at home. Discharged home with daughter with belonging's via wheel chair. All questions answered.
End Of Shift Note  [address] CVICU  Summary of shift: 7p-7a  Uneventful night for patient, HR remained between 70-80. Plan is for the patient to be discharged at noon today. Vitals:    Vitals:    06/01/23 1733 06/01/23 2000 06/02/23 0000 06/02/23 0400   BP: (!) 150/63 (!) 142/58 (!) 146/66 (!) 141/58   Pulse: 82 80 79 77   Resp:  17 20 20   Temp:  97.9 °F (36.6 °C) 97.8 °F (36.6 °C) 97.4 °F (36.3 °C)   TempSrc:  Temporal Temporal Temporal   SpO2:  99% 98% 99%   Weight:    137 lb 9.6 oz (62.4 kg)   Height:            I&O:   Intake/Output Summary (Last 24 hours) at 6/2/2023 2527  Last data filed at 6/2/2023 0400  Gross per 24 hour   Intake 646.87 ml   Output 2750 ml   Net -2103.13 ml       Resp Status: room air    Ventilator Settings:     / / /     Critical Care IV infusions:   sodium chloride 75 mL/hr at 06/02/23 0513    sodium chloride          LDA:   Peripheral IV 05/30/23 Distal;Posterior;Right Forearm (Active)   Number of days: 2       Peripheral IV 05/31/23 Left; Anterior Forearm (Active)   Number of days: 1       External Urinary Catheter (Active)   Number of days: 0
End Of Shift Note  [address] CVICU  Summary of shift: Patient had uneventful night. Vitals were stable, blood pressures around 130s-140s. No complaints of pain. She had great urine output with purewick. Daughter was updated overnight of patient condition. Vitals:    Vitals:    05/31/23 1800 05/31/23 2000 06/01/23 0000 06/01/23 0400   BP: (!) 140/60 (!) 132/53 (!) 132/51 (!) 133/51   Pulse: 69 65 68 71   Resp: 25 16 19 19   Temp:  98.6 °F (37 °C) 98.9 °F (37.2 °C) 98.7 °F (37.1 °C)   TempSrc:  Temporal Temporal Temporal   SpO2: 96% 95% 95% 96%   Weight:       Height:            I&O:   Intake/Output Summary (Last 24 hours) at 6/1/2023 0741  Last data filed at 6/1/2023 0630  Gross per 24 hour   Intake 1646.28 ml   Output 1650 ml   Net -3.72 ml       Resp Status: room air    Ventilator Settings:     / / /     Critical Care IV infusions:   sodium chloride      sodium chloride          LDA:   Peripheral IV 05/30/23 Distal;Posterior;Right Forearm (Active)   Number of days: 1       Peripheral IV 05/31/23 Left; Anterior Forearm (Active)   Number of days: 0
End Of Shift Note  [address] CVICU  Summary of shift: Patient remained hemodynamically stable throughout the shift today. Plan for discharge tomorrow during the early day. Family members at bedside. Shift report given inclusive of PMH, POC, and Current admission. At the time of this writing, patient is in bed resting in NAD. Call light is in reach. Vitals:    Vitals:    06/01/23 1200 06/01/23 1205 06/01/23 1600 06/01/23 1733   BP: (!) 151/54  (!) 136/25 (!) 150/63   Pulse: 82  73 82   Resp: 24  18    Temp: 98.2 °F (36.8 °C) 98.2 °F (36.8 °C) 98.4 °F (36.9 °C)    TempSrc: Temporal Temporal Temporal    SpO2: 97%  96%    Weight:       Height:            I&O:   Intake/Output Summary (Last 24 hours) at 6/1/2023 1850  Last data filed at 6/1/2023 1800  Gross per 24 hour   Intake 646.87 ml   Output 2850 ml   Net -2203.13 ml       Resp Status: RA.    Critical Care IV infusions:   sodium chloride 75 mL/hr at 06/01/23 1800    sodium chloride          LDA:   Peripheral IV 05/30/23 Distal;Posterior;Right Forearm (Active)   Number of days: 1       Peripheral IV 05/31/23 Left; Anterior Forearm (Active)   Number of days: 1
John 2  PROGRESS NOTE    Room # STA22/22   Name: Ayad Mendoza            Voodoo: Henriquez Deb     Reason for visit: Routine    I visited the patient. Admit Date & Time: 5/30/2023 11:25 AM    Assessment:  Dalia De La Cruz is a 80 y.o. female in the hospital because she has heart blockage. Upon entering the room patient is found resting in bed, lethargic but able to talk. Intervention:  I introduced myself and my title as  I offered space for patient  to express feelings, needs, and concerns and provided a ministry presence. Patient shared events that led to her ED visit and staff report that she is go to ICU for further inpatient support.  offers bedside prayer and emotional support. Outcome:  Patient is calm and coping. Plan:  Chaplains will remain available to offer spiritual and emotional support as needed. Electronically signed by Morris Joseph on 5/30/2023 at [facility]     05/30/23 1610   Encounter Summary   Service Provided For: Patient   Referral/Consult From: [facility] Family members   Last Encounter  05/30/23   Complexity of Encounter Low   Begin Time 1515   End Time  1525   Total Time Calculated 10 min   Encounter    Type Initial Screen/Assessment   Spiritual/Emotional needs   Type Spiritual Support   Assessment/Intervention/Outcome   Assessment Calm;Coping; Hopeful   Intervention Active listening;Explored/Affirmed feelings, thoughts, concerns;Nurtured Hope;Sustaining Presence/Ministry of presence   Outcome Comfort; Connection/Belonging;Receptive
[facility]  Office: [address], DO, Ronda Nazia, DO, Edmund Roque, DO, Saba Colin Blood, DO, Freya Kay MD, Shannon Chacon MD, Romel Boggs MD, Naila Lerma MD,  Prieto Shea MD, Sudha Kyle MD, Los Ching DO, Rita Ann MD,  Serge Franco MD, Emilie Paul MD, Hitesh Reddy, , Nomi Manley MD, Damon Hemphill MD, Leann Macias, DO, Shanae Baird MD, Munira Gordillo MD, Jonathan Winslow MD, Chip Costello MD,  Shannon Jaquez DO, Aysha Lang MD,  Ami Coreas CNP,  Oseas Phillips CNP, Miriam Gabriel CNP, Arnulfo Yao CNP,  Antoni Shell, [facility], Kate Jones, CNP, Allison Brice, CNP, Niko Hoang, CNP, Antonia Arreguin, CNP, William Gonzalez, CNP, SHAWN HodgesC, Barry Lang, CNS, Evan Roa, [facility], [facility]    Progress Note    5/31/2023    8:26 AM    Name:   Denise Gallagher  MRN:     [id number]     Acct:      [de-identified]   Room:   2031/2031-01  IP Day:  1  Admit Date:  5/30/2023 11:25 AM    PCP:   Nelda Clement MD  Code Status:  Full Code    Subjective:     C/C:   Chief Complaint   Patient presents with    Fatigue     Sent by PCP, states no energy, poor appetite     Interval History Status: not changed. Still pretty much asymptomatic  Denies cp/sob/n/v  Just has some fatigue    Brief History:     Per my JARETH:  \"Dalia Cedeño is a 80 y.o. Non- / non  female who presents with Fatigue (Sent by PCP, states no energy, poor appetite)   and is admitted to the hospital for the management of Complete heart block ([facility] [address] Patient reports that over the past 10 to 14 days she has been experiencing generalized fatigue and ambulatory weakness. She reported to her primary care doctor who initially found her to be bradycardic and cardiology was consulted via telemedicine.   Twelve-lead was done in the office and found to be
Occupational [address]  Occupational Therapy Not Seen Note    Patient not available for Occupational Therapy due to:    [] Testing:    [] Hemodialysis    [] Cancelled by RN:    [] Refusal by Patient:    [] Surgery:     [] Intubation:     [] Pain Medication:    [] Sedation:     [] Spine Precautions :    [] Medical Instability:    [x] Other: Other:  Cardiology planning for pacemaker placement this afternoon. OT will continue to follow and ck back tomorrow for evaluation.     [place], OT
Occupational Therapy  Facility/Department: [facility]  Rehabilitation Occupational Therapy Daily Treatment Note    Date: 23  Patient Name: Lion Le       Room:   MRN: [id number]  Account: [de-identified]   : 3/4/1932  (80 y.o.) Gender: female                    Past Medical History:  has a past medical history of GERD (gastroesophageal reflux disease), H/O aortic valve replacement, Hyperlipidemia, and Hypertension. Past Surgical History:   has a past surgical history that includes Hysterectomy; Cardiac surgery; joint replacement; and Tonsillectomy. Restrictions  Restrictions/Precautions: General Precautions, Fall Risk, ROM Restrictions, Surgical protocol  Implants present? : Pacemaker  Other position/activity restrictions: No L shoulder flex > 90, Up w/ assist, telemetry, R/L UE IV  Required Braces or Orthoses  Left Upper Extremity Brace/Splint: Sling (for 48 hrs post sx)  Required Braces or Orthoses?: Yes    Subjective  Subjective: Pt in bed upon arrival and agreeable to therapy. Restrictions/Precautions: General Precautions; Fall Risk;ROM Restrictions;Surgical protocol             Objective     Cognition  Overall Cognitive Status: WFL  Orientation  Overall Orientation Status: Within Normal Limits   Perception  Overall Perceptual Status: WFL     ADL  Grooming/Oral Hygiene  Assistance Level: Set-up; Verbal cues;Stand by assist  Skilled Clinical Factors: standing at sink for washing hands, oral care and brushing hair  Upper Extremity Dressing  Assistance Level: Set-up; Verbal cues; Minimal assistance  Skilled Clinical Factors: Verbal cues for proper technique to avoid raising L UE above head. Lower Extremity Dressing  Assistance Level: Set-up; Verbal cues; Minimal assistance  Skilled Clinical Factors: to thread underwear/pants over feet  Putting On/Taking Off Footwear  Assistance Level: Set-up; Verbal cues; Moderate assistance  Skilled Clinical Factors: socks and shoes  Toileting  Assistance Level:
[facility]  Office: [address], DO, Nan Sullivan, DO, Gustavo Jayde, DO, Prabhjot Munoz Blood, DO, Jesus Alberto Lowry MD, Stefanie Garner MD, Eliezer Sorensen MD, Hyun Strickland MD,  Brisa Buckley MD, Corinna Garcia MD, Beulah Ramirez, DO, Leonel Jarvis MD,  Ivis Servin MD, Marylin Pimentel MD, Lorna Miller DO, Keesha Downing MD, Jose Quiroz MD, Glenys Garcia, DO, Cj Orona MD, Sebas Jimenez MD, Candice Posey MD, Ney Burrell MD,  Meche Onofre DO, Noreen Correia MD,  Eder Barnett, CNP,  Baljinder Elizondo, CNP, Radha Doyle, CNP, Lakeshia Curry, CNP,  Claudeen Flesher, [facility], Izell Dakin, CNP, Sylwia Rojas, CNP, Marc Mendoza, CNP, Jewel Qiu, CNP, Lukas Lovell, CNP, SHAWN GoldmanC, Bren Greenwood, CNS, Karolina Coleman, CNP, Niecy Turner, [facility]    Progress Note    6/1/2023    4:07 PM    Name:   Kathryn Barbour  MRN:     [id number]     Acct:      [de-identified]   Room:   2031/2031-01   Day:  2  Admit Date:  5/30/2023 11:25 AM    PCP:   Cely Fleming MD  Code Status:  Full Code    Subjective:     C/C:   Chief Complaint   Patient presents with    Fatigue     Sent by PCP, states no energy, poor appetite     Interval History Status: significantly improved. Pacemaker placed yesterday. This was successful in resolving her bradycardia. Patient is hemodynamically stable and doing quite well. Plan for discharge once home care can be arranged. Outpatient follow-up with cardiology as directed. pacemaker placed yesterday. This was successful in resolving her bradycardia. Patient is hemodynamically stable and doing quite well. Plan for discharge once home care can be arranged. Outpatient follow-up with cardiology as directed. Brief History:     Patient reports that over the past 10 to 14 days she has been experiencing generalized fatigue and ambulatory weakness.   She
Patient arrived to room 2031 via ED cart with ED RN. Pt able to stand and pivot to bed. Monitor shows 3rd degree heart block with HR in the 20s-30s. BP is 149/90. Pt is completely asymptomatic at this time, A&Ox4, and denying pain. Pt educated on activity restrictions and oriented to room and provided call light. Reviewed the plan of care. Side rails up x2. Bed in lowest and locked position. All questions answered. Pt resting comfortably at this time.
Patient back on unit. Transported by Cath Lab RN's. HR 60 /68 SPO2 93%. Patient awake and speaking intelligibly to staff. Family updated.
Patient has maintained HR in the 20s-30s all night. Pt currently in 2nd degree type 2 (high grade) block with a QRS complex after every 3rd P wave. Pt remains a little fatigued and BP low, but MAP is  >65. Megan Penny and Blood aware. Per Dr. Therese Penny, proceed with pacemaker placement this afternoon. See new orders.
Patient returned from cath lab PPM placement. Pt stabilized, now V paced in the 60s. Pt A&Ox4, just a little sleepy. Denies any pain. Reviewed activity restrictions. All questions answered.
Physical Therapy  DATE: 2023    NAME: Aron Welch  MRN: [id number]   : 3/4/1932    Patient not seen this date for Physical Therapy due to:      [] Cancel by RN or physician due to:    [] Hemodialysis    [] Critical Lab Value Level     [] Blood transfusion in progress    [] Acute or unstable cardiovascular status   _MAP < 55 or more than >115  _HR < 40 or > 130    [] Acute or unstable pulmonary status   -FiO2 > 60%   _RR < 5 or >40    _O2 sats < 85%    [] Strict Bedrest    [] Off Unit for surgery or procedure    [] Off Unit for testing       [] Pending imaging to R/O fracture    [] Refusal by Patient      [x] Other:  Cardiology planning for pacemaker placement this afternoon. PT will continue to follow and ck back tomorrow for evaluation. [] PT being discontinued at this time. Patient independent. No further needs. [] PT being discontinued at this time as the patient has been transferred to hospice care. No further needs.       Paul Vallecillo, PT
Physician Progress Note      Radha Gleason  CSN #:                  [id number]  :                       3/4/1932  ADMIT DATE:       2023 11:25 AM  DISCH DATE:  RESPONDING  PROVIDER #:        Juarez Briscoe CNP          QUERY TEXT:    Patient admitted with AV block, noted to have paroxysmal atrial fibrillation. If possible, please document in progress notes and discharge summary if you   are evaluating and/or treating any of the following: The medical record reflects the following:  Risk Factors: Female, Advanced age, HTN  Clinical Indicators: [facility] of , patient with PAF - take aspirin daily  Treatment: Aspirin, Cardio consult, 2d Echo    Thank you,  Ángel Alfred RN  Options provided:  -- Secondary hypercoagulable state in a patient with atrial fibrillation  -- Other - I will add my own diagnosis  -- Disagree - Not applicable / Not valid  -- Disagree - Clinically unable to determine / Unknown  -- Refer to Clinical Documentation Reviewer    PROVIDER RESPONSE TEXT:    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 Query created by:  Anisa King on 2023 2:03 PM      Electronically signed by:  Jose Briscoe CNP 2023 9:24 AM
Pt's blood pressures elevated PPM placement. SBP 170s-190s. Dr. Ida Gramajo made aware. Verbal order to restart coreg 12.5 bid with one dose now.
Pulmonary Critical Care Progress Note    Patient seen for the follow up of Complete heart block ([address]     Subjective:    She has decreased heart rate at 32/min. She has stable blood pressure. She denies chest pain. No shortness of breath. Examination:    Vitals: BP (!) 146/51   Pulse (!) 30   Temp 97.8 °F (36.6 °C) (Temporal)   Resp (!) 31   Ht 4' 11\" (1.499 m)   Wt 127 lb 6.4 oz (57.8 kg)   SpO2 97%   BMI 25.73 kg/m²   SpO2  Av.3 %  Min: 72 %  Max: 99 %  General appearance: alert and cooperative with exam  Neck: No JVD  Lungs: Mild decreased breath sounds no crackles  Heart: regular rate and rhythm, bradycardic S1, S2 normal, no gallop  Abdomen: Soft, non tender, + BS  Extremities: no cyanosis or clubbing.  No significant edema    LABs:    CBC:   Recent Labs     23  1135 23  0249   WBC 10.8 7.0   HGB 12.2 10.4*   HCT 38.4 31.7*    166     BMP:   Recent Labs     23  1135 23  0249   * 134*   K 5.2 5.3   CO2 18* 19*   BUN 26* 30*   CREATININE 1.76* 1.92*   LABGLOM 27* 24*   GLUCOSE 152* 98     PT/INR:   Recent Labs     23  0249   PROTIME 16.8*   INR 1.4       Radiology:    CXR   Possible small bilateral pleural effusions     Otherwise, no acute cardiopulmonary process    Impression:  Third degree heart block/history of AVR  Dyspnea/ weakness due to above  Acute renal insufficiency  Remote history of smoking  GERD / HTN /HLD     Recommendations:  Incentive spirometry every hour awake  Monitor blood pressure and heart rate; dopamine drip if blood pressure decreases  IV fluids/monitor creatinine  Consider nephrology consult  Off Beta-blockers/off Coreg  Cardiology on consult for pacemaker today   address CODE STATUS  Discussed with RN  Discussed with grand daughter  DVT prophylaxis     Ingrid Doherty MD, MD, [facility]  Pulmonary Critical Care and Sleep Medicine,  [facility]  Cell: [phone]  Office: [phone]
Pulmonary Critical Care Progress Note    Patient seen for the follow up of Complete heart block ([address]     Subjective:    She has pacemaker placement and HR is improved. She slightly hypertensive. She has stable blood pressure. She denies chest pain. No shortness of breath. Examination:    Vitals: BP (!) 151/54   Pulse 82   Temp 98.2 °F (36.8 °C) (Temporal)   Resp 24   Ht 4' 11\" (1.499 m)   Wt 127 lb 6.4 oz (57.8 kg)   SpO2 97%   BMI 25.73 kg/m²   SpO2  Av.8 %  Min: 93 %  Max: 99 %  General appearance: alert and cooperative with exam  Neck: No JVD  Lungs: Mild decreased breath sounds no crackles  Heart: regular rate and rhythm, bradycardic S1, S2 normal, no gallop  Abdomen: Soft, non tender, + BS  Extremities: no cyanosis or clubbing.  No significant edema    LABs:    CBC:   Recent Labs     23  1135 23  0249   WBC 10.8 7.0   HGB 12.2 10.4*   HCT 38.4 31.7*    166       BMP:   Recent Labs     23  0249 23  0306   * 137   K 5.3 4.9   CO2 19* 22   BUN 30* 23   CREATININE 1.92* 1.47*   LABGLOM 24* 33*   GLUCOSE 98 113*       PT/INR:   Recent Labs     23  0249   PROTIME 16.8*   INR 1.4         Radiology:    CXR   Possible small bilateral pleural effusions     Otherwise, no acute cardiopulmonary process    Impression:  Third degree heart block/history of AVR  Dyspnea/ weakness due to above  Acute renal insufficiency  Remote history of smoking  GERD / HTN /HLD     Recommendations:  Incentive spirometry every hour awake  Monitor blood pressure   IV fluids/monitor creatinine  Off Beta-blockers/off Coreg  Cardiology on consult   address CODE STATUS  Physical therapy/ambulate  Discussed with PT  Discharge planning  DVT prophylaxis     Pablo White MD, MD, [facility]  Pulmonary Critical Care and Sleep Medicine,  [facility]  Cell: [phone]  Office: [phone]
[facility]  Office: [address], DO, Aquiles Torres, DO, Debbie Villanueva, DO, Ambar Figueroa, DO, Philly Barrera MD, Calin Landaverde MD, Manule Cesar MD, Jose Galloway MD,  Madeline Tapia MD, Vince Carter MD, Lulu Huizar, DO, Dipesh Cordova MD,  Heriberto Sumner MD, Theodore Dunham MD, Dori Singh DO, Megan Coffey MD, Kelly Blackwood MD, Ritika Woods DO, Iris Bates MD, Harish Jay MD, Uri Rehman MD, Ralf Mccoy MD,  Bassem Eckert DO, Maribell Goel MD,  Jose Roberto Gabriel, CNP,  Barbie Rios, CNP, Asa Dunham, CNP, Heena Avalos, CNP,  Chele Benoit, [place], Gali Xiao, CNP, Elsie Nagy, CNP, Domonique Cannon, CNP, Kaleb Fitzgerald, CNP, Evette Rowley, CNP, MARCIN Aguila-C, Racheal Cardona, CNS, Ifrah Burns, CNP, Britt[facility]    Progress Note    6/2/2023    11:27 AM    Name:   Bhaskar Wheatley  MRN:     [id number]     [place]:      [de-identified]   Room:   2031/2031-01   Day:  3  Admit Date:  5/30/2023 11:25 AM    PCP:   Andriy Farmer MD  Code Status:  Full Code    Subjective:     C/C:   Chief Complaint   Patient presents with    Fatigue     Sent by PCP, states no energy, poor appetite     Interval History Status: significantly improved. Patient resting comfortably  She denies pain, shortness of breath, chest pain, nausea or vomiting. Instructed on the use of incentive spirometry    Patient encouraged to follow-up with her primary care doctor 7 to 10 days after discharge and cardiology      Brief History:     Patient reports that over the past 10 to 14 days she has been experiencing generalized fatigue and ambulatory weakness. She reported to her primary care doctor who initially found her to be bradycardic and cardiology was consulted via telemedicine.   Twelve-lead was done in the office and found to be third-degree heart block with stable
Subjective: Denies CP or dyspnea.      VS: BP (!) 150/57   Pulse 79   Temp 98.4 °F (36.9 °C) (Temporal)   Resp 19   Ht 4' 11\" (1.499 m)   Wt 127 lb 6.4 oz (57.8 kg)   SpO2 96%   BMI 25.73 kg/m²     Wt:     I/O: In: 1646.3 [I.V.:1646.3]  Out: 2350 [Urine:2350]    Monitor: SR, Vpaced    Meds: Scheduled Meds:   sodium chloride flush  5-40 mL IntraVENous 2 times per day    carvedilol  12.5 mg Oral BID WC    aspirin  81 mg Oral Daily    atorvastatin  20 mg Oral Daily    cetirizine  5 mg Oral Daily    [Held by provider] losartan  100 mg Oral Daily    [Held by provider] spironolactone  50 mg Oral Daily    sodium chloride flush  5-40 mL IntraVENous 2 times per day    enoxaparin  30 mg SubCUTAneous Daily     Continuous Infusions:   sodium chloride 75 mL/hr at 06/01/23 0922    sodium chloride       PRN Meds:.sodium chloride flush, sodium chloride, sodium chloride flush, ondansetron **OR** ondansetron, polyethylene glycol, acetaminophen **OR** acetaminophen     Exam:General Appearance: AOX3, NAD  Skin: warm, dry  Pulmonary/Chest: Mildly diminished at bases anteriorly  Cardiovascular: Paced, + JVD  Extremities: No peripheral edema    Labs:     CBC with Differential:    Lab Results   Component Value Date/Time    WBC 7.0 05/31/2023 02:49 AM    RBC 3.40 05/31/2023 02:49 AM    HGB 10.4 05/31/2023 02:49 AM    HCT 31.7 05/31/2023 02:49 AM     05/31/2023 02:49 AM    MCV 93.2 05/31/2023 02:49 AM    MCH 30.6 05/31/2023 02:49 AM    MCHC 32.8 05/31/2023 02:49 AM    RDW 13.9 05/31/2023 02:49 AM    LYMPHOPCT 14 05/31/2023 02:49 AM    MONOPCT 10 05/31/2023 02:49 AM    BASOPCT 0 05/31/2023 02:49 AM    MONOSABS 0.71 05/31/2023 02:49 AM    LYMPHSABS 0.96 05/31/2023 02:49 AM    EOSABS 0.06 05/31/2023 02:49 AM    BASOSABS <0.03 05/31/2023 02:49 AM    DIFFTYPE NOT REPORTED 09/05/2019 10:40 AM       BMP:    Lab Results   Component Value Date/Time     06/01/2023 03:06 AM    K 4.9 06/01/2023 03:06 AM     06/01/2023 03:06 AM
Subjective: Denies CP or dyspnea.      VS: BP (!) 156/69   Pulse 81   Temp 98.7 °F (37.1 °C) (Temporal)   Resp 22   Ht 4' 11\" (1.499 m)   Wt 137 lb 9.6 oz (62.4 kg)   SpO2 99%   BMI 27.79 kg/m²     Wt:     I/O: In: 646.9 [I.V.:646.9]  Out: 1650 [Urine:1650]    Monitor: SR,  Vpaced    Meds: Scheduled Meds:   fluticasone  2 spray Each Nostril Daily    sodium chloride flush  5-40 mL IntraVENous 2 times per day    carvedilol  12.5 mg Oral BID WC    aspirin  81 mg Oral Daily    atorvastatin  20 mg Oral Daily    cetirizine  5 mg Oral Daily    losartan  100 mg Oral Daily    spironolactone  50 mg Oral Daily    sodium chloride flush  5-40 mL IntraVENous 2 times per day    enoxaparin  30 mg SubCUTAneous Daily     Continuous Infusions:   sodium chloride       PRN Meds:.sodium chloride flush, sodium chloride, sodium chloride flush, ondansetron **OR** ondansetron, polyethylene glycol, acetaminophen **OR** acetaminophen     Exam:General Appearance: AOX3 NAD  Skin: warm, dry left chest incision with steri strips CDI, no erythema  Pulmonary/Chest: CTA  Cardiovascular: Paced, aortic flow murmur, negative JVD  Extremities: 1+ peripheral edema    Labs:     CBC with Differential:    Lab Results   Component Value Date/Time    WBC 7.0 05/31/2023 02:49 AM    RBC 3.40 05/31/2023 02:49 AM    HGB 10.4 05/31/2023 02:49 AM    HCT 31.7 05/31/2023 02:49 AM     05/31/2023 02:49 AM    MCV 93.2 05/31/2023 02:49 AM    MCH 30.6 05/31/2023 02:49 AM    MCHC 32.8 05/31/2023 02:49 AM    RDW 13.9 05/31/2023 02:49 AM    LYMPHOPCT 14 05/31/2023 02:49 AM    MONOPCT 10 05/31/2023 02:49 AM    BASOPCT 0 05/31/2023 02:49 AM    MONOSABS 0.71 05/31/2023 02:49 AM    LYMPHSABS 0.96 05/31/2023 02:49 AM    EOSABS 0.06 05/31/2023 02:49 AM    BASOSABS <0.03 05/31/2023 02:49 AM    DIFFTYPE NOT REPORTED 09/05/2019 10:40 AM       BMP:    Lab Results   Component Value Date/Time     06/02/2023 03:45 AM    K 4.2 06/02/2023 03:45 AM     06/02/2023
The potassium/magnesium sliding scale has been automatically discontinued per [facility] approved policy because the patient has decreased renal function (CrCl<30 ml/min). The patient's current K/Mag levels are currently:    Recent Labs     05/30/23  1135 05/31/23  0249   K 5.2 5.3   MG 2.4  --        Estimated Creatinine Clearance: 15 mL/min (A) (based on SCr of 1.92 mg/dL (H)). For patients with decreased renal function (below 30ml/min) needing potassium/magnesium supplementation, please order individual bolus doses with appropriate monitoring. Please contact the inpatient pharmacy at [phone] with any concerns. Thank you.     Elke Sanchez, [facility]  5/31/2023  7:08 AM
limits (L UE to 90 per surgical protocol, R UE WFL)  Strength: Generally decreased, functional (R UE ~ 4/5)  Coordination: Within functional limits (slowed B finger to thumb opposition)  Tone: Normal  Sensation: Intact      ADL  Feeding: Setup  Grooming: Setup;Minimal assistance  UE Bathing: Setup; Moderate assistance  LE Bathing: Setup;Minimal assistance  UE Dressing: Setup; Moderate assistance (to tie/adjust hosp gown while seated on EOB and for donning sling per Dr. Vadim Ramon)  LE Dressing: Maximum assistance  Toileting: Dependent/Total (ext cath)  Additional Comments: Pts ADL is limited by fatigue, generalized weakness, decreased balance and surgical precautions. Activity Tolerance  Activity Tolerance: Patient limited by endurance      Bed mobility  Supine to Sit: Minimal assistance  Sit to Supine: Minimal assistance; Moderate assistance;2 Person assistance  Scooting: Moderate assistance;2 Person assistance  Bed Mobility Comments: Pt completed bed mobility with difficulites this date. Pt required increased time/effort to complete. Pt given Mod verbal cues for pacing self, use of bedrails, initaition, sequencing all to increase safety. Pt reported mild dizziness once seated on EOB, which subsided quickly. Transfers  Sit to stand: Moderate assistance  Stand to sit: Moderate assistance  Transfer Comments: Pt completed STS transfers with some difficulites. Pt given Mod verbal cues for pacing self, upright posture, intitiation, sequencing and line mgmt all to increase safety. Vision  Vision: Impaired  Vision Exceptions: Wears glasses at all times  Hearing  Hearing: Within functional limits      Cognition  Overall Cognitive Status: Exceptions  Arousal/Alertness: Appropriate responses to stimuli  Following Commands: Follows multistep commands with repitition  Attention Span: Difficulty attending to directions; Attends with cues to redirect  Memory: Appears intact  Safety Judgement: Decreased awareness of
Patient Goals : To go home       Education  Patient Education  Education Given To: Patient  Education Provided: Role of Therapy;Plan of Care; Fall Prevention Strategies  Education Provided Comments: Pt educated on: purpose of acute PT eval, importance of continued mobility throughout admission, general safety awareness, fall risk prevention, and PT POC. Pt w/ fair return demo. Education Method: Verbal  Education Outcome: Verbalized understanding      Therapy Time   Individual Concurrent Group Co-treatment   Time In 3649         Time Out 1500         Minutes 42         Treatment time: 25 minutes     Co-treatment with OT warranted secondary to decreased safety and independence requiring 2 skilled therapy professionals to address individual discipline's goals. PT addressing pre gait trunk strengthening, weight shifting prior to transfers, and transfer training.       Kiera Molina, PT

## 2023-06-03 NOTE — DISCHARGE SUMMARY
[facility]  Office: [address], DO, Kaycee Velasquezmer, DO, Cynthia Stable, DO, Iain España Blood, DO, Vee Tavarez MD, Pamella Gutiérrez MD, Zane Cedeno MD, Dave Duong MD,  Keesha Bowden MD, Bard Malcolm MD, Lelia Montes, DO, Yuliya Chavez MD,  Monik Pearson MD, Corrinne Phenes, MD, Benjamín Easton, DO, Emely Jain MD, Maxim Saenz MD, Eduar Dubois, DO, Fletcher Barajas MD, Elva Espinosa MD, Ana M Aly MD, Elvin Torres MD,  Libby Clifford DO, James Mcduffie MD,  Tyrese Vaughn, CNP,  Kerry Elise, CNP, Sanam Don, CNP, Lucero Bartlett, CNP,  Boyd Willoughby, [facility], Isha Garcia, CNP, Rain Hernandez, CNP, Chele Charles, CNP, Tim Bowden, CNP, Daniella Turner, CNP, Aydee Valenzuela PA-C, Raad Stinson, CNS, Cierra Barajas, CNP, Alyson Diana, [facility]    Discharge Summary     Patient ID: Billy Trejo  :  3/4/1932   MRN: [id number]     ACCOUNT:  [de-identified]   Patient's PCP: Vidal Santa MD  Admit Date: 2023   Discharge Date: 6/3/2023     Length of Stay: 3  Code Status:  Prior  Admitting Physician: Benito Figueroa DO  Discharge Physician: TODD Hardwick NP     Active Discharge Diagnoses:     Hospital Problem Lists:  Principal Problem:    Complete heart block [facility])  Active Problems:    Primary hypertension    Hyperlipidemia    ARNIE (acute kidney injury) ([facility]  Resolved Problems:    * No resolved hospital problems. *      Admission Condition:  fair     Discharged Condition: stable    Hospital Stay:     Hospital Course:  Billy Trejo is a 80 y.o. female who was admitted for the management of  Complete heart block ([facility] , presented to ER with Fatigue (Sent by PCP, states no energy, poor appetite)    Patient reports that over the past 10 to 14 days she has been experiencing generalized fatigue and ambulatory weakness.   She reported to her primary care
check    [address] [place] [address] [address] [address]  [phone]  Call  Home Care           Diet: regular diet, cardiac diet, and low fat, low cholesterol diet    Activity: As tolerated    Instructions to Patient:   Follow up with PCP in one week  Take medications as instructed  Call 911 or return to the ER if you experience a recurrence of your symptoms or start having fever, chills, chest pain or shortness of breath. Discharge Medications:      Medication List        CONTINUE taking these medications      aspirin 81 MG tablet     atorvastatin 20 MG tablet  Commonly known as: LIPITOR     calcium carbonate-vitamin D 600-200 MG-UNIT Tabs     carvedilol 25 MG tablet  Commonly known as: COREG     fluticasone 50 MCG/ACT nasal spray  Commonly known as: Flonase  2 sprays by Nasal route daily     latanoprost 0.005 % ophthalmic solution  Commonly known as: XALATAN     loratadine 10 MG tablet  Commonly known as: CLARITIN  Take 1 tablet by mouth daily     MULTI-VITAMIN DAILY PO     spironolactone 50 MG tablet  Commonly known as: ALDACTONE     timolol 0.25 % ophthalmic solution  Commonly known as: TIMOPTIC     EFFIE-C PO     vitamin E 400 UNIT capsule            STOP taking these medications      losartan 100 MG tablet  Commonly known as: COZAAR              No discharge procedures on file. Time Spent on discharge is  33 mins in patient examination, evaluation, counseling as well as medication reconciliation, prescriptions for required medications, discharge plan and follow up. Electronically signed by   TODD Roberts CNP  6/3/2023  4:02 PM      Thank you Dr. Dickson Fields MD for the opportunity to be involved in this patient's care.

## 2023-06-05 ENCOUNTER — CARE COORDINATION (OUTPATIENT)
Dept: CASE MANAGEMENT | Age: 88
End: 2023-06-05

## 2023-06-05 DIAGNOSIS — I44.2 COMPLETE HEART BLOCK (HCC): Primary | ICD-10-CM

## 2023-06-05 PROCEDURE — 1111F DSCHRG MED/CURRENT MED MERGE: CPT | Performed by: INTERNAL MEDICINE

## 2023-06-05 NOTE — CARE COORDINATION
scheduled within 7 days of discharge? Yes. Follow Up  Future Appointments   Date Time Provider Jarrod Jonas   6/6/2023 11:00 AM [facility] Transition Nurse provided contact information. Plan for follow-up call in 5-7 days based on severity of symptoms and risk factors.   Plan for next call: symptom management-reassess fatigue - weakness - improved after pacer implant, check pacer site  follow-up appointment-review PCP and cardio appts (6/6 + 6/8)    Lauren Seals RN

## 2023-06-06 ENCOUNTER — OFFICE VISIT (OUTPATIENT)
Dept: INTERNAL MEDICINE CLINIC | Age: 88
End: 2023-06-06

## 2023-06-06 VITALS
HEART RATE: 94 BPM | RESPIRATION RATE: 18 BRPM | WEIGHT: 126.8 LBS | SYSTOLIC BLOOD PRESSURE: 142 MMHG | TEMPERATURE: 97.2 F | DIASTOLIC BLOOD PRESSURE: 70 MMHG | BODY MASS INDEX: 25.56 KG/M2 | HEIGHT: 59 IN | OXYGEN SATURATION: 98 %

## 2023-06-06 DIAGNOSIS — Z95.0 S/P PLACEMENT OF CARDIAC PACEMAKER: ICD-10-CM

## 2023-06-06 DIAGNOSIS — R00.1 BRADYCARDIA: ICD-10-CM

## 2023-06-06 DIAGNOSIS — I44.2 THIRD DEGREE ATRIOVENTRICULAR BLOCK (HCC): ICD-10-CM

## 2023-06-06 DIAGNOSIS — I10 ESSENTIAL HYPERTENSION: Primary | ICD-10-CM

## 2023-06-06 NOTE — PROGRESS NOTES
REPLACEMENT      rt hip    TONSILLECTOMY         Family History   Problem Relation Age of Onset    High Blood Pressure Mother     Diabetes Sister     Cancer Sister      ROS  Constitutional:  Negative for fatigue, loss of appetite and unexpected weight change  HEENT            : Negative for neck stiffness and pain, no congestion or sinus pressure  Eyes                : No visual disturbance or pain  Cardiovascular: No chest pain or palpitations or leg swelling  Respiratory      : Negative for cough, shortness of breath or wheezing  Gastrointestinal: Negative for abdominal pain, constipation or diarrhea and bloating No nausea or vomiting  Genitourinary:     No urgency or frequency, no burning or hematuria  Musculoskeletal: No arthralgias, back pain or myalgias  Skin                  : Negative for rash or erythema  Neurological    : Negative for dizziness, weakness, tremors ,light headedness or syncope  Psychiatric       : Negative for dysphoric mood, sleep disturbances, nervous or anxious, or decreased concentration  All other review of systems was negative    Objective  Physical Examination:    Nursing note reviewed    BP (!) 142/70 (Site: Right Upper Arm, Position: Sitting, Cuff Size: Medium Adult)   Pulse 94   Temp 97.2 °F (36.2 °C) (Temporal)   Resp 18   Ht 4' 11\" (1.499 m)   Wt 126 lb 12.8 oz (57.5 kg)   SpO2 98%   BMI 25.61 kg/m²   BP Readings from Last 3 Encounters:   06/06/23 (!) 142/70   06/02/23 (!) 157/88   05/30/23 102/60         Constitutional:  Cleatrice is oriented to place, person and time ,appears well-developed and well-nourished  HEENT:  Atraumatic and normocephalic, external ears normal bilaterally, nose normal no oropharyngeal exudate and is clear and moist  Eyes:  EOCM normal; conjunctivae normal; PERRLA bilaterally  Neck:  Normal range of motion, neck supple, no JVD and no thyromegaly  Cardiovascular:  RRR, normal heart sounds and intact distal pulses  Pulmonary:  effort normal and

## 2023-06-21 ENCOUNTER — CARE COORDINATION (OUTPATIENT)
Dept: CASE MANAGEMENT | Age: 88
End: 2023-06-21

## 2023-06-21 NOTE — CARE COORDINATION
[facility] Care Transitions Follow Up Call    Patient Current Location:  Home: [address] [place] [address] Coordinator contacted the patient by telephone to follow up after admission on 2023-. Verified name and  with patient as identifiers. Patient: Jasper Espinoza  Patient : 3/4/1932   MRN: [id number]  Reason for Admission: Complete heart block,pacemaker implant  Discharge Date: 23 RARS: Readmission Risk Score: 11.8      Needs to be reviewed by the provider   Additional needs identified to be addressed with provider: No  none             Method of communication with provider: none    Writer spoke with Darrenerica for her follow up care transitions call. Patient states she is feeling \"wonderful\". Denies any CP,SOB or palpitations. Stated appetite is normal. Denies pain or swelling at site of Pacemaker. No issues with bowel or bladder. Addressed changes since last contact:  none  Discussed follow-up appointments. If no appointment was previously scheduled, appointment scheduling offered: Yes. Is follow up appointment scheduled within 7 days of discharge? Yes. Follow Up  Future Appointments   Date Time Provider Jarrod Jonas   2023  2:00 PM [facility](s): N/A    LPN Care Coordinator reviewed discharge instructions with patient and discussed any barriers to care and/or understanding of plan of care after discharge. Discussed appropriate site of care based on symptoms and resources available to patient including: PCP  Specialist  Urgent care clinics  When to call 911. The patient agrees to contact the PCP office for questions related to their healthcare. Advance Care Planning:   reviewed and current.         Care Transitions Subsequent and Final Call    Subsequent and Final Calls  Do you have any ongoing symptoms?: No  Have your medications changed?: No  Do you have any questions related to your

## 2023-06-30 ENCOUNTER — CARE COORDINATION (OUTPATIENT)
Dept: CASE MANAGEMENT | Age: 88
End: 2023-06-30

## 2023-07-05 ENCOUNTER — HOSPITAL ENCOUNTER (OUTPATIENT)
Age: 88
Setting detail: SPECIMEN
Discharge: HOME | End: 2023-07-05

## 2023-07-06 LAB
ANION GAP SERPL CALCULATED.3IONS-SCNC: 16 MMOL/L (ref 9–17)
BUN SERPL-MCNC: 16 MG/DL (ref 8–23)
CALCIUM SERPL-MCNC: 10 MG/DL (ref 8.6–10.4)
CHLORIDE SERPL-SCNC: 99 MMOL/L (ref 98–107)
CO2 SERPL-SCNC: 24 MMOL/L (ref 20–31)
CREAT SERPL-MCNC: 0.74 MG/DL (ref 0.5–0.9)
GFR SERPL CREATININE-BSD FRML MDRD: >60 ML/MIN/1.73M2
GLUCOSE SERPL-MCNC: 75 MG/DL (ref 70–99)
POTASSIUM SERPL-SCNC: 4.9 MMOL/L (ref 3.7–5.3)
SODIUM SERPL-SCNC: 139 MMOL/L (ref 135–144)

## 2023-08-02 ENCOUNTER — HOSPITAL ENCOUNTER (OUTPATIENT)
Age: 88
Setting detail: SPECIMEN
Discharge: HOME | End: 2023-08-02

## 2023-08-02 LAB
ALBUMIN SERPL-MCNC: 4.1 G/DL (ref 3.5–5.2)
ALBUMIN/GLOB SERPL: 1.6 {RATIO} (ref 1–2.5)
ALP SERPL-CCNC: 73 U/L (ref 35–104)
ALT SERPL-CCNC: 12 U/L (ref 5–33)
ANION GAP SERPL CALCULATED.3IONS-SCNC: 12 MMOL/L (ref 9–17)
AST SERPL-CCNC: 22 U/L
BILIRUB SERPL-MCNC: 0.5 MG/DL (ref 0.3–1.2)
BUN SERPL-MCNC: 17 MG/DL (ref 8–23)
CALCIUM SERPL-MCNC: 10.1 MG/DL (ref 8.6–10.4)
CHLORIDE SERPL-SCNC: 106 MMOL/L (ref 98–107)
CHOLEST SERPL-MCNC: 123 MG/DL
CHOLESTEROL/HDL RATIO: 2.2
CK SERPL-CCNC: 29 U/L (ref 26–192)
CO2 SERPL-SCNC: 27 MMOL/L (ref 20–31)
CREAT SERPL-MCNC: 0.7 MG/DL (ref 0.5–0.9)
GFR SERPL CREATININE-BSD FRML MDRD: >60 ML/MIN/1.73M2
GLUCOSE P FAST SERPL-MCNC: 103 MG/DL (ref 70–99)
HDLC SERPL-MCNC: 55 MG/DL
LDLC SERPL CALC-MCNC: 56 MG/DL (ref 0–130)
POTASSIUM SERPL-SCNC: 3.9 MMOL/L (ref 3.7–5.3)
PROT SERPL-MCNC: 6.7 G/DL (ref 6.4–8.3)
SODIUM SERPL-SCNC: 145 MMOL/L (ref 135–144)
TRIGL SERPL-MCNC: 61 MG/DL

## 2023-09-07 ENCOUNTER — OFFICE VISIT (OUTPATIENT)
Dept: INTERNAL MEDICINE CLINIC | Age: 88
End: 2023-09-07

## 2023-09-07 VITALS
HEIGHT: 59 IN | OXYGEN SATURATION: 99 % | SYSTOLIC BLOOD PRESSURE: 160 MMHG | TEMPERATURE: 97.4 F | WEIGHT: 109.4 LBS | BODY MASS INDEX: 22.05 KG/M2 | DIASTOLIC BLOOD PRESSURE: 80 MMHG | RESPIRATION RATE: 24 BRPM | HEART RATE: 91 BPM

## 2023-09-07 DIAGNOSIS — Z23 NEED FOR PROPHYLACTIC VACCINATION AGAINST DIPHTHERIA-TETANUS-PERTUSSIS (DTP): ICD-10-CM

## 2023-09-07 DIAGNOSIS — Z00.00 INITIAL MEDICARE ANNUAL WELLNESS VISIT: Primary | ICD-10-CM

## 2023-09-07 DIAGNOSIS — Z23 NEED FOR PROPHYLACTIC VACCINATION AND INOCULATION AGAINST VARICELLA: ICD-10-CM

## 2023-09-07 RX ORDER — LOSARTAN POTASSIUM 100 MG/1
TABLET ORAL
COMMUNITY
Start: 2023-08-09

## 2023-09-07 RX ORDER — HYDROCHLOROTHIAZIDE 12.5 MG/1
12.5 TABLET ORAL DAILY
COMMUNITY
Start: 2023-08-02

## 2023-09-07 ASSESSMENT — PATIENT HEALTH QUESTIONNAIRE - PHQ9
SUM OF ALL RESPONSES TO PHQ QUESTIONS 1-9: 0
1. LITTLE INTEREST OR PLEASURE IN DOING THINGS: 0
2. FEELING DOWN, DEPRESSED OR HOPELESS: 0
SUM OF ALL RESPONSES TO PHQ9 QUESTIONS 1 & 2: 0

## 2023-09-07 ASSESSMENT — LIFESTYLE VARIABLES: HOW OFTEN DO YOU HAVE A DRINK CONTAINING ALCOHOL: NEVER

## 2023-09-07 NOTE — PROGRESS NOTES
into the muscle once for 1 dose Yes Nancy Kilpatrick MD   zoster recombinant adjuvanted vaccine [facility]) 50 MCG/0.5ML SUSR injection Inject 0.5 mLs into the muscle once for 1 dose Yes Nancy Kilpatrick MD   Ascorbic Acid (EFFIE-C PO) Take by mouth Yes Historical Provider, MD   timolol (TIMOPTIC) 0.25 % ophthalmic solution PLACE ONE DROP INTO BOTH EYES IN THE MORNING.  Yes Historical Provider, MD   spironolactone (ALDACTONE) 50 MG tablet Take 1 tablet by mouth daily Yes Historical Provider, MD   loratadine (CLARITIN) 10 MG tablet Take 1 tablet by mouth daily Yes Nancy Kilpatrick MD   latanoprost (XALATAN) 0.005 % ophthalmic solution Place 1 drop into both eyes nightly Yes Historical Provider, MD   vitamin E 400 UNIT capsule Take 1 capsule by mouth daily Yes Historical Provider, MD   atorvastatin (LIPITOR) 20 MG tablet Take 1 tablet by mouth daily Yes Historical Provider, MD   carvedilol (COREG) 25 MG tablet Take 0.5 tablets by mouth 2 times daily 1/2 tab Bid Yes Historical Provider, MD   fluticasone (FLONASE) 50 MCG/ACT nasal spray 2 sprays by Nasal route daily  Patient taking differently: 2 sprays by Nasal route daily as needed Yes Nancy Kilpatrick MD   aspirin 81 MG tablet Take 1 tablet by mouth daily Yes Historical Provider, MD   Multiple Vitamin (MULTI-VITAMIN DAILY PO) Take 1 tablet by mouth daily  Yes Historical Provider, MD   calcium carbonate-vitamin D 600-200 MG-UNIT TABS Take 1 tablet by mouth 2 times daily  Yes Historical Provider, MD       CareTeam (Including outside providers/suppliers regularly involved in providing care):   Patient Care Team:  Nancy Kilpatrick MD as PCP - General (Internal Medicine)  Nancy Kilpatrick MD as PCP - Empaneled Provider     Reviewed and updated this visit:  Tobacco  Allergies  Meds  Med Hx  Surg Hx  Soc Hx  Fam Hx

## 2024-02-22 ENCOUNTER — HOSPITAL ENCOUNTER (OUTPATIENT)
Age: 89
Setting detail: SPECIMEN
Discharge: HOME | End: 2024-02-22

## 2024-02-22 LAB
ALBUMIN SERPL-MCNC: 4.4 G/DL (ref 3.5–5.2)
ALBUMIN/GLOB SERPL: 2 {RATIO} (ref 1–2.5)
ALP SERPL-CCNC: 75 U/L (ref 35–104)
ALT SERPL-CCNC: 14 U/L (ref 10–35)
ANION GAP SERPL CALCULATED.3IONS-SCNC: 11 MMOL/L (ref 9–16)
AST SERPL-CCNC: 26 U/L (ref 10–35)
BILIRUB SERPL-MCNC: 0.6 MG/DL (ref 0–1.2)
BUN SERPL-MCNC: 11 MG/DL (ref 8–23)
CALCIUM SERPL-MCNC: 9.9 MG/DL (ref 8.6–10.4)
CHLORIDE SERPL-SCNC: 99 MMOL/L (ref 98–107)
CHOLEST SERPL-MCNC: 123 MG/DL (ref 0–199)
CHOLESTEROL/HDL RATIO: 2
CK SERPL-CCNC: 25 U/L (ref 26–192)
CO2 SERPL-SCNC: 27 MMOL/L (ref 20–31)
CREAT SERPL-MCNC: 0.9 MG/DL (ref 0.5–0.9)
GFR SERPL CREATININE-BSD FRML MDRD: 59 ML/MIN/1.73M2
GLUCOSE P FAST SERPL-MCNC: 106 MG/DL (ref 74–99)
HDLC SERPL-MCNC: 57 MG/DL
LDLC SERPL CALC-MCNC: 51 MG/DL (ref 0–100)
POTASSIUM SERPL-SCNC: 4.4 MMOL/L (ref 3.7–5.3)
PROT SERPL-MCNC: 6.8 G/DL (ref 6.6–8.7)
SODIUM SERPL-SCNC: 137 MMOL/L (ref 136–145)
TRIGL SERPL-MCNC: 77 MG/DL (ref 0–149)
VLDLC SERPL CALC-MCNC: 15 MG/DL

## 2024-07-23 ENCOUNTER — OFFICE VISIT (OUTPATIENT)
Dept: FAMILY MEDICINE CLINIC | Age: 89
End: 2024-07-23
Payer: MEDICARE

## 2024-07-23 VITALS
DIASTOLIC BLOOD PRESSURE: 88 MMHG | HEIGHT: 59 IN | RESPIRATION RATE: 18 BRPM | TEMPERATURE: 98 F | SYSTOLIC BLOOD PRESSURE: 130 MMHG | OXYGEN SATURATION: 98 % | HEART RATE: 91 BPM | BODY MASS INDEX: 22.62 KG/M2 | WEIGHT: 112.2 LBS

## 2024-07-23 DIAGNOSIS — E78.00 PURE HYPERCHOLESTEROLEMIA: ICD-10-CM

## 2024-07-23 DIAGNOSIS — I50.22 CHRONIC SYSTOLIC (CONGESTIVE) HEART FAILURE (HCC): ICD-10-CM

## 2024-07-23 DIAGNOSIS — I10 PRIMARY HYPERTENSION: Primary | ICD-10-CM

## 2024-07-23 PROBLEM — I44.2 COMPLETE HEART BLOCK (HCC): Status: RESOLVED | Noted: 2023-05-30 | Resolved: 2024-07-23

## 2024-07-23 PROCEDURE — 1123F ACP DISCUSS/DSCN MKR DOCD: CPT | Performed by: INTERNAL MEDICINE

## 2024-07-23 PROCEDURE — 99214 OFFICE O/P EST MOD 30 MIN: CPT | Performed by: INTERNAL MEDICINE

## 2024-07-23 RX ORDER — AMIODARONE HYDROCHLORIDE 200 MG/1
TABLET ORAL
COMMUNITY
Start: 2018-07-31

## 2024-07-23 RX ORDER — ACETAMINOPHEN 325 MG/1
650 TABLET ORAL EVERY 6 HOURS PRN
COMMUNITY

## 2024-07-23 NOTE — PROGRESS NOTES
Dalia Pruitt is a 89 y.o. female who presents for   Chief Complaint   Patient presents with    Hypertension    Hyperlipidemia    and follow up of chronic medical problems.  Patient Active Problem List   Diagnosis    Primary hypertension    Hyperlipidemia    ARNIE (acute kidney injury) (HCC)    Chronic systolic (congestive) heart failure     HPI  Here for follow up on blood pressure and cholesterol and denies any complaints    Current Outpatient Medications   Medication Sig Dispense Refill    acetaminophen (TYLENOL) 325 MG tablet Take 2 tablets by mouth every 6 hours as needed      amiodarone (CORDARONE) 200 MG tablet 200 milligrams p.o. b.i.d. x7 days then decrease dose to 200 milligrams p.o. daily times 30 days      hydroCHLOROthiazide (HYDRODIURIL) 12.5 MG tablet Take 1 tablet by mouth daily      losartan (COZAAR) 100 MG tablet       Ascorbic Acid (EFFIE-C PO) Take by mouth      timolol (TIMOPTIC) 0.25 % ophthalmic solution PLACE ONE DROP INTO BOTH EYES IN THE MORNING.      spironolactone (ALDACTONE) 50 MG tablet Take 1 tablet by mouth daily      loratadine (CLARITIN) 10 MG tablet Take 1 tablet by mouth daily 90 tablet 1    vitamin E 400 UNIT capsule Take 1 capsule by mouth daily      carvedilol (COREG) 25 MG tablet Take 0.5 tablets by mouth 2 times daily 1/2 tab Bid      fluticasone (FLONASE) 50 MCG/ACT nasal spray 2 sprays by Nasal route daily (Patient taking differently: 2 sprays by Nasal route daily as needed) 1 Bottle 0    aspirin 81 MG tablet Take 1 tablet by mouth daily      Multiple Vitamin (MULTI-VITAMIN DAILY PO) Take 1 tablet by mouth daily       calcium carbonate-vitamin D 600-200 MG-UNIT TABS Take 1 tablet by mouth 2 times daily       latanoprost (XALATAN) 0.005 % ophthalmic solution Place 1 drop into both eyes nightly      atorvastatin (LIPITOR) 20 MG tablet Take 1 tablet by mouth daily       No current facility-administered medications for this visit.       No Known Allergies    Past Medical History:

## 2024-10-29 ENCOUNTER — HOSPITAL ENCOUNTER (OUTPATIENT)
Age: 89
Setting detail: SPECIMEN
Discharge: HOME | End: 2024-10-29

## 2024-10-29 LAB
ALBUMIN SERPL-MCNC: 4.4 G/DL (ref 3.5–5.2)
ALBUMIN/GLOB SERPL: 1.8 {RATIO} (ref 1–2.5)
ALP SERPL-CCNC: 81 U/L (ref 35–104)
ALT SERPL-CCNC: 11 U/L (ref 10–35)
ANION GAP SERPL CALCULATED.3IONS-SCNC: 9 MMOL/L (ref 9–16)
AST SERPL-CCNC: 23 U/L (ref 10–35)
BILIRUB SERPL-MCNC: 0.7 MG/DL (ref 0–1.2)
BUN SERPL-MCNC: 17 MG/DL (ref 8–23)
CALCIUM SERPL-MCNC: 10.2 MG/DL (ref 8.6–10.4)
CHLORIDE SERPL-SCNC: 98 MMOL/L (ref 98–107)
CHOLEST SERPL-MCNC: 119 MG/DL (ref 0–199)
CHOLESTEROL/HDL RATIO: 2
CK SERPL-CCNC: 26 U/L (ref 26–192)
CO2 SERPL-SCNC: 28 MMOL/L (ref 20–31)
CREAT SERPL-MCNC: 1 MG/DL (ref 0.6–0.9)
GFR, ESTIMATED: 53 ML/MIN/1.73M2
GLUCOSE SERPL-MCNC: 99 MG/DL (ref 74–99)
HDLC SERPL-MCNC: 59 MG/DL
LDLC SERPL CALC-MCNC: 45 MG/DL (ref 0–100)
POTASSIUM SERPL-SCNC: 4.7 MMOL/L (ref 3.7–5.3)
PROT SERPL-MCNC: 6.8 G/DL (ref 6.6–8.7)
SODIUM SERPL-SCNC: 135 MMOL/L (ref 136–145)
TRIGL SERPL-MCNC: 77 MG/DL (ref 0–149)
VLDLC SERPL CALC-MCNC: 15 MG/DL (ref 1–30)

## 2025-02-07 ENCOUNTER — OFFICE VISIT (OUTPATIENT)
Dept: FAMILY MEDICINE CLINIC | Age: 89
End: 2025-02-07

## 2025-02-07 VITALS
DIASTOLIC BLOOD PRESSURE: 85 MMHG | WEIGHT: 109.4 LBS | HEIGHT: 59 IN | HEART RATE: 103 BPM | SYSTOLIC BLOOD PRESSURE: 135 MMHG | BODY MASS INDEX: 22.05 KG/M2

## 2025-02-07 DIAGNOSIS — I50.22 CHRONIC SYSTOLIC (CONGESTIVE) HEART FAILURE (HCC): ICD-10-CM

## 2025-02-07 DIAGNOSIS — J30.2 SEASONAL ALLERGIES: ICD-10-CM

## 2025-02-07 DIAGNOSIS — Z86.79 HISTORY OF COMPLETE HEART BLOCK: ICD-10-CM

## 2025-02-07 DIAGNOSIS — I10 PRIMARY HYPERTENSION: Primary | ICD-10-CM

## 2025-02-07 RX ORDER — LORATADINE 10 MG/1
5 TABLET ORAL DAILY
Qty: 90 TABLET | Refills: 1 | Status: SHIPPED | OUTPATIENT
Start: 2025-02-07

## 2025-02-07 SDOH — ECONOMIC STABILITY: FOOD INSECURITY: WITHIN THE PAST 12 MONTHS, THE FOOD YOU BOUGHT JUST DIDN'T LAST AND YOU DIDN'T HAVE MONEY TO GET MORE.: NEVER TRUE

## 2025-02-07 SDOH — ECONOMIC STABILITY: FOOD INSECURITY: WITHIN THE PAST 12 MONTHS, YOU WORRIED THAT YOUR FOOD WOULD RUN OUT BEFORE YOU GOT MONEY TO BUY MORE.: NEVER TRUE

## 2025-02-07 ASSESSMENT — PATIENT HEALTH QUESTIONNAIRE - PHQ9
2. FEELING DOWN, DEPRESSED OR HOPELESS: NOT AT ALL
SUM OF ALL RESPONSES TO PHQ QUESTIONS 1-9: 0
1. LITTLE INTEREST OR PLEASURE IN DOING THINGS: NOT AT ALL
SUM OF ALL RESPONSES TO PHQ9 QUESTIONS 1 & 2: 0
SUM OF ALL RESPONSES TO PHQ QUESTIONS 1-9: 0

## 2025-02-07 NOTE — PATIENT INSTRUCTIONS
Thank you for letting us take care of you today. We hope all your questions were addressed. If a question was overlooked or something else comes to mind after you return home, please contact a member of your Care Team listed below.        Your Care Team at [facility] is Team #4  Crista Balderas M.D. (Faculty)  Manoj Shen M.D. (Resident)  Akiko Velazquez M.D.  (Resident)  Julio Cesar Galindo M.D. (Resident)  Lj Thao M.D. (Resident)  Bruno Damico, A  Domi Klein, [facility]  Jaimie Mcgovern, [facility]  Mya Andrew, [facility]  Genny Mcdonough, A  Toya Pina, [facility]  Fanta Pryor () TRICIA Rosen (Clinical Practice Manager)  Candy Oneil [facility] (Clinical Pharmacist)       Office phone number: [phone]    If you need to get in right away due to illness, please be advised we have \"Same Day\" appointments available Monday-Friday. Please call us at [phone] option #3 to schedule your \"Same Day\" appointment.     [facility] Utility - Financial Resources*  (Call [employer]/[facility] if need more resources).       UTILITY ASSISTANCE:  [facility]  What the offer: Emergency Shelters, Overnight Shelters, Employment, Meals, Mental Health Support, Clothing, Utility Assistance, Rent Assistance  Address: [address] [place] [address], [facility]  Phone Number: [phone]  Website:  [email or web address]    [employer]  What they offer: Meals, Clothing, Utility Assistance, Physical Health, Rent Assistance,   Emergency Shelters, Substance Abuse  Address: [address] [place] [address], [facility]  Phone Number: [phone]  Website: [email or web address]/    [employer]  What they offer: Meals, Spiritual support, Utility Assistance, Transportation  Address: [address] [place] [address], [facility]  Phone Number: [phone]  Website:

## 2025-02-07 NOTE — PROGRESS NOTES
Attending Physician Statement  I  have discussed the care of Dalia Pruitt including pertinent history and exam findings with the resident. I agree with the assessment, plan and orders as documented by the resident.      /85 (Site: Right Upper Arm, Position: Sitting, Cuff Size: Medium Adult)   Pulse (!) 103   Ht 1.499 m (4' 11.02\")   Wt 49.6 kg (109 lb 6.4 oz)   BMI 22.08 kg/m²    BP Readings from Last 3 Encounters:   02/07/25 135/85   07/23/24 130/88   09/07/23 (!) 160/80     Wt Readings from Last 3 Encounters:   02/07/25 49.6 kg (109 lb 6.4 oz)   07/23/24 50.9 kg (112 lb 3.2 oz)   09/07/23 49.6 kg (109 lb 6.4 oz)          Diagnosis Orders   1. Primary hypertension        2. Chronic systolic (congestive) heart failure (HCC)        3. Seasonal allergies  loratadine (CLARITIN) 10 MG tablet              Melva Valdez MD 2/7/2025 4:51 PM

## 2025-02-07 NOTE — PROGRESS NOTES
Visit Information    Have you changed or started any medications since your last visit including any over-the-counter medicines, vitamins, or herbal medicines? no   Are you having any side effects from any of your medications? -  no  Have you stopped taking any of your medications? Is so, why? -  no    Have you seen any other physician or provider since your last visit? No  Have you had any other diagnostic tests since your last visit? No  Have you been seen in the emergency room and/or had an admission to a hospital since we last saw you? No  Have you had your routine dental cleaning in the past 6 months? no    Have you activated your [employer] account? If not, what are your barriers? Yes     Patient Care Team:  Manoj Gonzales MD as PCP - General (Internal Medicine)  Manoj Gonzales MD as PCP - Empaneled Provider    Medical History Review  Past Medical, Family, and Social History reviewed and does not contribute to the patient presenting condition    Health Maintenance   Topic Date Due    DTaP/Tdap/Td vaccine (1 - Tdap) Never done    Shingles vaccine (1 of 2) Never done    Respiratory Syncytial Virus (RSV) Pregnant or age 60 yrs+ (1 - 1-dose 75+ series) Never done    COVID-19 Vaccine (5 - 2024-25 season) 09/01/2024    Annual Wellness Visit (Medicare Advantage)  01/01/2025    Depression Screen  07/23/2025    Flu vaccine  Completed    Pneumococcal 50+ years Vaccine  Completed    Hepatitis A vaccine  Aged Out    Hepatitis B vaccine  Aged Out    Hib vaccine  Aged Out    Polio vaccine  Aged Out    Meningococcal (ACWY) vaccine  Aged Out    Lipids  Discontinued

## 2025-07-17 ENCOUNTER — OFFICE VISIT (OUTPATIENT)
Age: 89
End: 2025-07-17
Payer: MEDICARE

## 2025-07-17 VITALS
HEART RATE: 89 BPM | WEIGHT: 114 LBS | BODY MASS INDEX: 23.01 KG/M2 | TEMPERATURE: 98.3 F | OXYGEN SATURATION: 99 % | SYSTOLIC BLOOD PRESSURE: 140 MMHG | DIASTOLIC BLOOD PRESSURE: 76 MMHG

## 2025-07-17 DIAGNOSIS — I50.22 CHRONIC SYSTOLIC (CONGESTIVE) HEART FAILURE (HCC): ICD-10-CM

## 2025-07-17 DIAGNOSIS — Z86.79 HISTORY OF COMPLETE HEART BLOCK: ICD-10-CM

## 2025-07-17 DIAGNOSIS — I10 PRIMARY HYPERTENSION: Primary | ICD-10-CM

## 2025-07-17 PROCEDURE — 99213 OFFICE O/P EST LOW 20 MIN: CPT

## 2025-07-17 PROCEDURE — 1123F ACP DISCUSS/DSCN MKR DOCD: CPT

## 2025-07-17 PROCEDURE — 1159F MED LIST DOCD IN RCRD: CPT

## 2025-07-17 PROCEDURE — 1126F AMNT PAIN NOTED NONE PRSNT: CPT

## 2025-07-17 NOTE — PROGRESS NOTES
[facility] Medicine Residency Program - Outpatient Note            Dalia Pruitt is a 89 y.o. female  presented to the office on 07/17/25: Hypertension follow-up      Assessement/Plan:      Diagnosis Orders   1. Primary hypertension        2. Chronic systolic (congestive) heart failure (HCC)        3. History of complete heart block            Assessment & Plan  Hypertension- Blood pressure was noted to be elevated today- Currently taking carvedilol, hydrochlorothiazide, and losartan 100 mg for blood pressure management- Blood pressure rechecked and better controlled- Advised to continue current medications as prescribed.  History of complete heart block and systolic CHF-currently stable- Permanent pacemaker in place with regular follow-ups with the cardiologist- Scheduled to see the cardiologist again in 01/2026- Advised to complete the blood work ordered by the cardiologist before the next visit.  Cataracts- Mentioned having cataracts and retinal issues- Scheduled to see the eye doctor in 09/2025.    Risks, benefits, side effects of new medications were discussed. The patient was educated on when to seek emergency medical care. All questions answered.     HPI/Subjective:   HPI  History of Present Illness  The patient presents for follow-up on hypertension.     She reports a slight increase in her blood pressure today, which she attributes to the stress of this visit.  She is compliant with her medications.  Was recently seen by her cardiologist earlier in July 2025 and she is scheduled for a follow-up with her cardiologist in January 2026. She reports no new symptoms or complaints. She lives independently and manages her own cooking and cleaning. She does not require any assistance and has regular social interactions with friends and family. She is compliant with her medication regimen, which includes low-dose aspirin, Lipitor 20 mg, carvedilol, hydrochlorothiazide, losartan 100 mg, spironolactone, 
Attending Physician Statement  I  have discussed the care of Dalia Pruitt including pertinent history and exam findings with the resident. I agree with the assessment, plan and orders as documented by the resident.      BP (!) 140/76   Pulse 89   Temp 98.3 °F (36.8 °C) (Oral)   Wt 51.7 kg (114 lb)   SpO2 99%   BMI 23.01 kg/m²    BP Readings from Last 3 Encounters:   07/17/25 (!) 140/76   02/07/25 135/85   07/23/24 130/88     Wt Readings from Last 3 Encounters:   07/17/25 51.7 kg (114 lb)   02/07/25 49.6 kg (109 lb 6.4 oz)   07/23/24 50.9 kg (112 lb 3.2 oz)          Diagnosis Orders   1. Primary hypertension        2. Chronic systolic (congestive) heart failure (HCC)        3. History of complete heart block                Crista Balderas DO 7/17/2025 3:59 PM      
Visit Information    Have you changed or started any medications since your last visit including any over-the-counter medicines, vitamins, or herbal medicines? no   Have you stopped taking any of your medications? Is so, why? -  no  Are you having any side effects from any of your medications? - no    Have you seen any other physician or provider since your last visit?  no   Have you had any other diagnostic tests since your last visit?  no   Have you been seen in the emergency room and/or had an admission in a hospital since we last saw you?  no   Have you had your routine dental cleaning in the past 6 months?  no     Do you have an active MyChart account? If no, what is the barrier?  No:     Patient Care Team:  Akiko Velazquez MD as PCP - General (Family Medicine)  Manoj Gonzales MD as PCP - Empaneled Provider    Medical History Review  Past Medical, Family, and Social History reviewed and does not contribute to the patient presenting condition    Health Maintenance   Topic Date Due    DTaP/Tdap/Td vaccine (1 - Tdap) Never done    Shingles vaccine (1 of 2) Never done    Respiratory Syncytial Virus (RSV) Pregnant or age 60 yrs+ (1 - 1-dose 75+ series) Never done    COVID-19 Vaccine (5 - 2024-25 season) 09/01/2024    Annual Wellness Visit (Medicare Advantage)  01/01/2025    Flu vaccine (1) 08/01/2025    Depression Screen  02/07/2026    Pneumococcal 50+ years Vaccine  Completed    Hepatitis A vaccine  Aged Out    Hepatitis B vaccine  Aged Out    Hib vaccine  Aged Out    Polio vaccine  Aged Out    Meningococcal (ACWY) vaccine  Aged Out    Meningococcal B vaccine  Aged Out    Lipids  Discontinued             
89.8

## 2025-08-14 ENCOUNTER — HOSPITAL ENCOUNTER (OUTPATIENT)
Age: 89
Setting detail: SPECIMEN
Discharge: HOME | End: 2025-08-14

## 2025-08-14 ENCOUNTER — OFFICE VISIT (OUTPATIENT)
Age: 89
End: 2025-08-14
Payer: MEDICARE

## 2025-08-14 VITALS
DIASTOLIC BLOOD PRESSURE: 68 MMHG | BODY MASS INDEX: 23.31 KG/M2 | HEART RATE: 83 BPM | HEIGHT: 59 IN | SYSTOLIC BLOOD PRESSURE: 118 MMHG | OXYGEN SATURATION: 98 % | WEIGHT: 115.6 LBS

## 2025-08-14 DIAGNOSIS — Z00.00 ENCOUNTER FOR ANNUAL WELLNESS VISIT (AWV) IN MEDICARE PATIENT: Primary | ICD-10-CM

## 2025-08-14 DIAGNOSIS — Z00.00 ENCOUNTER FOR ANNUAL WELLNESS VISIT (AWV) IN MEDICARE PATIENT: ICD-10-CM

## 2025-08-14 LAB
ANION GAP SERPL CALCULATED.3IONS-SCNC: 12 MMOL/L (ref 9–16)
BUN SERPL-MCNC: 29 MG/DL (ref 8–23)
CALCIUM SERPL-MCNC: 9.9 MG/DL (ref 8.6–10.4)
CHLORIDE SERPL-SCNC: 100 MMOL/L (ref 98–107)
CO2 SERPL-SCNC: 25 MMOL/L (ref 20–31)
CREAT SERPL-MCNC: 1.2 MG/DL (ref 0.6–0.9)
GFR, ESTIMATED: 42 ML/MIN/1.73M2
GLUCOSE SERPL-MCNC: 93 MG/DL (ref 74–99)
POTASSIUM SERPL-SCNC: 4.6 MMOL/L (ref 3.7–5.3)
SODIUM SERPL-SCNC: 137 MMOL/L (ref 136–145)

## 2025-08-14 PROCEDURE — G0439 PPPS, SUBSEQ VISIT: HCPCS

## 2025-08-14 PROCEDURE — 1159F MED LIST DOCD IN RCRD: CPT

## 2025-08-14 PROCEDURE — 1123F ACP DISCUSS/DSCN MKR DOCD: CPT

## 2025-08-14 ASSESSMENT — PATIENT HEALTH QUESTIONNAIRE - PHQ9
SUM OF ALL RESPONSES TO PHQ QUESTIONS 1-9: 0
2. FEELING DOWN, DEPRESSED OR HOPELESS: NOT AT ALL
SUM OF ALL RESPONSES TO PHQ QUESTIONS 1-9: 0
1. LITTLE INTEREST OR PLEASURE IN DOING THINGS: NOT AT ALL
SUM OF ALL RESPONSES TO PHQ QUESTIONS 1-9: 0
SUM OF ALL RESPONSES TO PHQ QUESTIONS 1-9: 0

## 2025-08-14 ASSESSMENT — LIFESTYLE VARIABLES
HOW OFTEN DO YOU HAVE A DRINK CONTAINING ALCOHOL: NEVER
HOW MANY STANDARD DRINKS CONTAINING ALCOHOL DO YOU HAVE ON A TYPICAL DAY: PATIENT DOES NOT DRINK